# Patient Record
Sex: MALE | Race: WHITE | Employment: FULL TIME | URBAN - METROPOLITAN AREA
[De-identification: names, ages, dates, MRNs, and addresses within clinical notes are randomized per-mention and may not be internally consistent; named-entity substitution may affect disease eponyms.]

---

## 2017-01-25 ENCOUNTER — HOSPITAL ENCOUNTER (OUTPATIENT)
Dept: ULTRASOUND IMAGING | Age: 42
Discharge: HOME | End: 2017-01-25
Attending: FAMILY MEDICINE
Payer: COMMERCIAL

## 2017-01-25 ENCOUNTER — OFFICE VISIT (OUTPATIENT)
Dept: FAMILY MEDICINE CLINIC | Facility: CLINIC | Age: 42
End: 2017-01-25

## 2017-01-25 VITALS
WEIGHT: 178 LBS | DIASTOLIC BLOOD PRESSURE: 78 MMHG | TEMPERATURE: 98 F | RESPIRATION RATE: 16 BRPM | HEART RATE: 66 BPM | HEIGHT: 69 IN | SYSTOLIC BLOOD PRESSURE: 106 MMHG | BODY MASS INDEX: 26.36 KG/M2

## 2017-01-25 DIAGNOSIS — R25.1 OCCASIONAL TREMORS: ICD-10-CM

## 2017-01-25 DIAGNOSIS — R63.4 UNINTENTIONAL WEIGHT LOSS: Primary | ICD-10-CM

## 2017-01-25 DIAGNOSIS — R63.4 UNINTENTIONAL WEIGHT LOSS: ICD-10-CM

## 2017-01-25 PROCEDURE — 99213 OFFICE O/P EST LOW 20 MIN: CPT | Performed by: FAMILY MEDICINE

## 2017-01-25 PROCEDURE — 76536 US EXAM OF HEAD AND NECK: CPT

## 2017-01-25 NOTE — PROGRESS NOTES
Pt here with wife and has not been seen since 2015. He has dropped weight quickly and cannot stop shaking and has had an unintentional weight loss of 20 pounds in past year and tremors and shaking all the time. He is flushed as well.  He feels jittery and h shortness of breath with exertion  CARDIOVASCULAR: denies chest pain on exertion but feels increased heart rate on and off   GI: denies abdominal pain and denies heartburn; no blood in stool or urine   NEURO: denies headaches; tremors of arms and legs at r Thyroid peroxidase & thyroglobulin ab [E]; Future  -     Comp Metabolic Panel (14) [E]  -     CBC W Differential W Platelet [E]  -     Magnesium [E]; Future  -     US THYROID ([id number]=[id number]);  Future    Dr. Polo Solis

## 2017-01-25 NOTE — PROGRESS NOTES
Quick Note:    Please call pt with normal results of thyroid US with no growths seen-- Dr. Jadiel Head  ______

## 2017-01-25 NOTE — PATIENT INSTRUCTIONS
Mendel Rand you were seen for tremors and weight loss highly suspicious for thyroid concerns. Get labs done today and an US of thyroid. See me in a week for follow up and discuss results. Take care, Dr. Niko Siu    Please read only on the following:    Wh · Feeling unusually cold when others feel comfortable  Symptoms of hyperthyroidism include:  · Restlessness  · Fast weight loss  · Sweating  · Fast heartbeat (more than 100 beats per minute)  · Feeling unusually hot when others feel comfortable  Eye care

## 2017-01-26 NOTE — PROGRESS NOTES
Quick Note:    Please call lab and make sure the pt's thyroid labs were drawn--not seeing the results? His blood sugar is up a little as well as his bilirubin. Rest of CMP is stable.  Cbc is stable other than mild increase in RBC---I really need his thyro

## 2017-01-28 LAB
ABSOLUTE BASOPHILS: 14 CELLS/UL (ref 0–200)
ABSOLUTE EOSINOPHILS: 80 CELLS/UL (ref 15–500)
ABSOLUTE LYMPHOCYTES: 2251 CELLS/UL (ref 850–3900)
ABSOLUTE MONOCYTES: 564 CELLS/UL (ref 200–950)
ABSOLUTE NEUTROPHILS: 1791 CELLS/UL (ref 1500–7800)
ALBUMIN/GLOBULIN RATIO: 1.5 (CALC) (ref 1–2.5)
ALBUMIN: 4.1 G/DL (ref 3.6–5.1)
ALKALINE PHOSPHATASE: 110 U/L (ref 40–115)
ALT: 34 U/L (ref 9–46)
AST: 23 U/L (ref 10–40)
BASOPHILS: 0.3 %
BILIRUBIN, TOTAL: 2.1 MG/DL (ref 0.2–1.2)
BUN: 23 MG/DL (ref 7–25)
CALCIUM: 9.9 MG/DL (ref 8.6–10.3)
CARBON DIOXIDE: 28 MMOL/L (ref 20–31)
CHLORIDE: 102 MMOL/L (ref 98–110)
CREATININE: 0.85 MG/DL (ref 0.6–1.35)
EGFR IF AFRICN AM: 124 ML/MIN/1.73M2
EGFR IF NONAFRICN AM: 107 ML/MIN/1.73M2
EOSINOPHILS: 1.7 %
GLOBULIN: 2.8 G/DL (CALC) (ref 1.9–3.7)
GLUCOSE: 112 MG/DL (ref 65–99)
HEMATOCRIT: 47.6 % (ref 38.5–50)
HEMOGLOBIN: 16 G/DL (ref 13.2–17.1)
LYMPHOCYTES: 47.9 %
MCH: 27.1 PG (ref 27–33)
MCHC: 33.6 G/DL (ref 32–36)
MCV: 80.8 FL (ref 80–100)
MONOCYTES: 12 %
MPV: 7.9 FL (ref 7.5–12.5)
NEUTROPHILS: 38.1 %
PLATELET COUNT: 233 THOUSAND/UL (ref 140–400)
POTASSIUM: 4 MMOL/L (ref 3.5–5.3)
PROTEIN, TOTAL: 6.9 G/DL (ref 6.1–8.1)
RDW: 12.6 % (ref 11–15)
RED BLOOD CELL COUNT: 5.89 MILLION/UL (ref 4.2–5.8)
SODIUM: 138 MMOL/L (ref 135–146)
T4, FREE: 4.2 NG/DL (ref 0.8–1.8)
THYROGLOBULIN ANTIBODIES: 68 IU/ML
THYROID PEROXIDASE$ANTIBODIES: >900 IU/ML
TSH W/REFLEX TO FT4: <0.01 MIU/L (ref 0.4–4.5)
WHITE BLOOD CELL COUNT: 4.7 THOUSAND/UL (ref 3.8–10.8)

## 2017-01-30 NOTE — PROGRESS NOTES
Quick Note:    Please call pt with confirmation of hyperthyroidism as TSH suppressed and antibodies elevated over 900 as reason for his palpitations and weight loss and tremors--he will need referral to endocrinologist ASAP. Dr. Mar Melendez or Dr. Marian Covington.  He is hugo

## 2017-01-31 ENCOUNTER — TELEPHONE (OUTPATIENT)
Dept: FAMILY MEDICINE CLINIC | Facility: CLINIC | Age: 42
End: 2017-01-31

## 2017-01-31 NOTE — PROGRESS NOTES
Quick Note:    Patient notified of new DX of hyperthyroidism and elevated thyroid antibodies which is most likely the cause of his heart palpitations, weight loss and tremors.  I gave him the name and number to Dr boyle to call and schedule a appointment [facility]

## 2017-02-02 ENCOUNTER — OFFICE VISIT (OUTPATIENT)
Dept: FAMILY MEDICINE CLINIC | Facility: CLINIC | Age: 42
End: 2017-02-02

## 2017-02-02 VITALS
TEMPERATURE: 99 F | WEIGHT: 180 LBS | BODY MASS INDEX: 26.66 KG/M2 | HEART RATE: 72 BPM | HEIGHT: 69 IN | DIASTOLIC BLOOD PRESSURE: 76 MMHG | SYSTOLIC BLOOD PRESSURE: 114 MMHG | RESPIRATION RATE: 16 BRPM

## 2017-02-02 DIAGNOSIS — R25.1 TREMOR OF BOTH HANDS: ICD-10-CM

## 2017-02-02 DIAGNOSIS — R63.4 WEIGHT LOSS, NON-INTENTIONAL: ICD-10-CM

## 2017-02-02 DIAGNOSIS — E05.90 PRIMARY HYPERTHYROIDISM: Primary | ICD-10-CM

## 2017-02-02 PROCEDURE — 99213 OFFICE O/P EST LOW 20 MIN: CPT | Performed by: FAMILY MEDICINE

## 2017-02-02 NOTE — PROGRESS NOTES
Pt Rom Baird is a 35year old male was seen with recent unintentional weight loss and tremors and sent for labs on 1/25/17 and TSH was not detectable at <0.0005  and results gave confirmation of suspected primary hyperthyroidism as TSH suppressed Never Smoker                      Alcohol Use: Yes                Comment: occasionally        REVIEW OF SYSTEMS:   GENERAL HEALTH: feels off and here for new diagnosis of hyperthyroidism.  see HPI notes above for further details  SKIN: denies any unusual s advised for treatment options    Weight loss, non-intentional  Due to hyperthyroidism and pt to see endocrinologist     Tremor of both hands  Due to weight loss and hyperthyroid state--pt to see endocrinologist    Dr. Dhiraj Costello

## 2017-02-02 NOTE — PATIENT INSTRUCTIONS
When You Have Graves Disease  You have been diagnosed with Graves disease. This means you have an overactive thyroid gland (hyperthyroidism). Thyroid hormone is important to your body's growth and metabolism.  But if you have too much thyroid hormone, you · If you have eyelid swelling, sleep with your head elevated. · If you have eye irritation, ask your healthcare provider about ointments or artificial tears.  Wear glasses with side guards to protect your eyes from dust and wind. If you have trouble closin There are a number of causes of hyperthyroidism. The most common cause is Grave’s disease. This occurs when the body’s immune system causes the thyroid to grow and make more thyroid hormone than needed.   Symptoms of hyperthyroidism include:  · Nervousness, Call your healthcare provider right away if any of these occur:  · New symptoms occur  · Symptoms return, continue, or worsen even after treatment  · Extreme fatigue  · Puffy hands, face, or feet  · Confusion  Call 911  Call 911 right away if any of these

## 2017-03-18 ENCOUNTER — OFFICE VISIT (OUTPATIENT)
Dept: FAMILY MEDICINE CLINIC | Facility: CLINIC | Age: 42
End: 2017-03-18

## 2017-03-18 VITALS
TEMPERATURE: 99 F | WEIGHT: 190 LBS | OXYGEN SATURATION: 98 % | HEART RATE: 92 BPM | RESPIRATION RATE: 18 BRPM | DIASTOLIC BLOOD PRESSURE: 60 MMHG | BODY MASS INDEX: 28 KG/M2 | SYSTOLIC BLOOD PRESSURE: 120 MMHG

## 2017-03-18 DIAGNOSIS — J01.00 ACUTE MAXILLARY SINUSITIS, RECURRENCE NOT SPECIFIED: Primary | ICD-10-CM

## 2017-03-18 PROCEDURE — 99213 OFFICE O/P EST LOW 20 MIN: CPT | Performed by: NURSE PRACTITIONER

## 2017-03-18 RX ORDER — BENZONATATE 200 MG/1
200 CAPSULE ORAL 3 TIMES DAILY PRN
Qty: 20 CAPSULE | Refills: 0 | Status: SHIPPED | OUTPATIENT
Start: 2017-03-18 | End: 2017-03-25

## 2017-03-18 RX ORDER — FLUTICASONE PROPIONATE 50 MCG
2 SPRAY, SUSPENSION (ML) NASAL DAILY
Qty: 1 BOTTLE | Refills: 0 | Status: SHIPPED | OUTPATIENT
Start: 2017-03-18 | End: 2017-03-18 | Stop reason: CLARIF

## 2017-03-18 RX ORDER — DOXYCYCLINE HYCLATE 100 MG/1
100 CAPSULE ORAL 2 TIMES DAILY
Qty: 20 CAPSULE | Refills: 0 | Status: SHIPPED | OUTPATIENT
Start: 2017-03-18 | End: 2017-03-28
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
## 2017-12-21 ENCOUNTER — OFFICE VISIT (OUTPATIENT)
Dept: FAMILY MEDICINE CLINIC | Facility: CLINIC | Age: 42
End: 2017-12-21

## 2017-12-21 VITALS
OXYGEN SATURATION: 99 % | DIASTOLIC BLOOD PRESSURE: 76 MMHG | RESPIRATION RATE: 16 BRPM | SYSTOLIC BLOOD PRESSURE: 112 MMHG | HEART RATE: 65 BPM | HEIGHT: 69 IN | TEMPERATURE: 98 F | WEIGHT: 207 LBS | BODY MASS INDEX: 30.66 KG/M2

## 2017-12-21 DIAGNOSIS — R06.83 SNORING: ICD-10-CM

## 2017-12-21 DIAGNOSIS — Z00.8 TESTICULAR EXAM: ICD-10-CM

## 2017-12-21 DIAGNOSIS — Z71.82 EXERCISE COUNSELING: ICD-10-CM

## 2017-12-21 DIAGNOSIS — J45.20 MILD INTERMITTENT CHRONIC ASTHMA WITHOUT COMPLICATION: ICD-10-CM

## 2017-12-21 DIAGNOSIS — Z00.8 RECTAL EXAM: ICD-10-CM

## 2017-12-21 DIAGNOSIS — Z00.00 ROUTINE GENERAL MEDICAL EXAMINATION AT A HEALTH CARE FACILITY: Primary | ICD-10-CM

## 2017-12-21 DIAGNOSIS — Z71.3 DIETARY COUNSELING: ICD-10-CM

## 2017-12-21 DIAGNOSIS — Z13.31 DEPRESSION SCREEN: ICD-10-CM

## 2017-12-21 DIAGNOSIS — Z12.5 SCREENING FOR PROSTATE CANCER: ICD-10-CM

## 2017-12-21 PROCEDURE — 81003 URINALYSIS AUTO W/O SCOPE: CPT | Performed by: FAMILY MEDICINE

## 2017-12-21 PROCEDURE — 99396 PREV VISIT EST AGE 40-64: CPT | Performed by: FAMILY MEDICINE

## 2017-12-21 NOTE — PROGRESS NOTES
Rom Baird is a 38year old male who presents for a complete physical exam.  Due for labs. Has Graves disease and sees Dr. Korina Mora. Pt feeling well today.  Dentist and eye doctors--seen;   Diet -healthy on and off;    Exercise --started 4 weeks ago Rfl:       Past Medical History:   Diagnosis Date   • Acute bronchitis    • Acute bronchospasm    • Acute frontal sinusitis    • Acute maxillary sinusitis    • Asthmatic bronchitis    • Bronchospasm     acute   • Condyloma    • Cough     acute   • ETD (eus lb   SpO2 99%   BMI 30.57 kg/m²   Body mass index is 30.57 kg/m².    GENERAL: well developed, well nourished,in no apparent distress  SKIN: no rashes,no suspicious lesions  HEENT: atraumatic, normocephalic,ears and throat are clear  EYES:PERRLA, EOMI, kwame for well exam and testicular and rectal exams. He is clinically well. He is due for annual labs. He is to keep up with healthy diet and exercise and see Dr. Wiliam Knapp for [facility] management.  He is to return for annual exams and as needed    Screening for prostat

## 2018-01-07 ENCOUNTER — OFFICE VISIT (OUTPATIENT)
Dept: FAMILY MEDICINE CLINIC | Facility: CLINIC | Age: 43
End: 2018-01-07

## 2018-01-07 VITALS
OXYGEN SATURATION: 98 % | HEIGHT: 69 IN | RESPIRATION RATE: 16 BRPM | BODY MASS INDEX: 31.25 KG/M2 | HEART RATE: 88 BPM | TEMPERATURE: 98 F | DIASTOLIC BLOOD PRESSURE: 70 MMHG | WEIGHT: 211 LBS | SYSTOLIC BLOOD PRESSURE: 110 MMHG

## 2018-01-07 DIAGNOSIS — K12.2 UVULITIS: ICD-10-CM

## 2018-01-07 DIAGNOSIS — J02.9 SORE THROAT: Primary | ICD-10-CM

## 2018-01-07 LAB
CONTROL LINE PRESENT WITH A CLEAR BACKGROUND (YES/NO): YES YES/NO
STREP GRP A CUL-SCR: NEGATIVE

## 2018-01-07 PROCEDURE — 99213 OFFICE O/P EST LOW 20 MIN: CPT | Performed by: PHYSICIAN ASSISTANT

## 2018-01-07 PROCEDURE — 87880 STREP A ASSAY W/OPTIC: CPT | Performed by: PHYSICIAN ASSISTANT

## 2018-01-07 RX ORDER — AMOXICILLIN 875 MG/1
875 TABLET, COATED ORAL 2 TIMES DAILY
Qty: 20 TABLET | Refills: 0 | Status: SHIPPED | OUTPATIENT
Start: 2018-01-07 | End: 2018-01-09

## 2018-01-07 NOTE — PROGRESS NOTES
CHIEF COMPLAINT:   Patient presents with:  Sore Throat: X 2 days sore throat, cough       HPI:   Larry Saldaña is a 38year old male who presents for URI sxs for  2 days. Patient reports sore throat, slight cough-dry, minimal nasal congestion.  Dhiraj Comment: occasionally         REVIEW OF SYSTEMS:   GENERAL: Decreased appetite  SKIN: no rashes or abnormal skin lesions  HEENT: See HPI  LUNGS: denies shortness of breath or wheezing, See HPI  CARDIOVASCULAR: denies chest pain or palpitations   GI: den PLAN: Meds as below. See patient Instructions    Meds & Refills for this Visit:    Signed Prescriptions Disp Refills    amoxicillin 875 MG Oral Tab 20 tablet 0      Sig: Take 1 tablet (875 mg total) by mouth 2 (two) times daily.            Risks, benefits, The doctor may prescribe antibiotics for an infection. For an allergic reaction or angioedema, medicines called steroids or antihistamines may be given. Follow instructions when using any medicine.   General care  To care for the condition at home:  · Rest · Child younger than 3years of age has a fever of 100.4°F (38°C) that continues for more than 1 day. · Child 3years old or older has a fever of 100.4°F (38°C) that continues for more than 3 days.   Call 911  Call 911 if any of these occur:  · Drooling or Over-the-counter medicine can reduce sore throat symptoms. Ask your pharmacist if you have questions about which medicine to use:  · Ease pain with anesthetic sprays. Aspirin or an aspirin substitute also helps.  Remember, never give aspirin to anyone 18 or

## 2018-01-07 NOTE — PATIENT INSTRUCTIONS
-GO to ER with any worsening symptoms  -Motrin every 4-6 hours as needed    Uvulitis    The uvula is the tissue that hangs in the back of the throat. Uvulitis is inflammation of the uvula.  Inflammation happens when the body responds to an injury, allergic · If medicines were prescribed, be sure they are taken as directed. They should be taken until they are gone or the healthcare provider says to stop them.   · If you were told that your angioedema was from a medicine that you are taking, you must stop [place] · Swollen tongue, lips, face, or throat. You can tell this if your child's voice changes.   · Jerking and loss of consciousness; seizure  · Lack of response, extreme drowsiness, or trouble waking up  · Fainting  · Confusion  · Child being unresponsive  · Sk · Remember: unless a sore throat is caused by a bacterial infection, antibiotics won’t help you. Prevent future sore throats  Prevention tips include the following:  · Stop smoking or reduce contact with secondhand smoke.  Smoke irritates the tender throat

## 2018-01-09 ENCOUNTER — OFFICE VISIT (OUTPATIENT)
Dept: FAMILY MEDICINE CLINIC | Facility: CLINIC | Age: 43
End: 2018-01-09

## 2018-01-09 VITALS
HEART RATE: 90 BPM | WEIGHT: 204 LBS | RESPIRATION RATE: 16 BRPM | SYSTOLIC BLOOD PRESSURE: 120 MMHG | DIASTOLIC BLOOD PRESSURE: 80 MMHG | HEIGHT: 69 IN | BODY MASS INDEX: 30.21 KG/M2 | TEMPERATURE: 99 F

## 2018-01-09 DIAGNOSIS — R53.81 MALAISE AND FATIGUE: Primary | ICD-10-CM

## 2018-01-09 DIAGNOSIS — L50.9 HIVES: ICD-10-CM

## 2018-01-09 DIAGNOSIS — R53.83 MALAISE AND FATIGUE: Primary | ICD-10-CM

## 2018-01-09 PROCEDURE — 99213 OFFICE O/P EST LOW 20 MIN: CPT | Performed by: FAMILY MEDICINE

## 2018-01-09 PROCEDURE — 87798 DETECT AGENT NOS DNA AMP: CPT | Performed by: FAMILY MEDICINE

## 2018-01-09 PROCEDURE — 87502 INFLUENZA DNA AMP PROBE: CPT | Performed by: FAMILY MEDICINE

## 2018-01-09 RX ORDER — OSELTAMIVIR PHOSPHATE 75 MG/1
75 CAPSULE ORAL 2 TIMES DAILY
Qty: 10 CAPSULE | Refills: 0 | Status: SHIPPED | OUTPATIENT
Start: 2018-01-09 | End: 2018-01-14

## 2018-01-09 RX ORDER — METHYLPREDNISOLONE 4 MG/1
TABLET ORAL
Qty: 1 KIT | Refills: 0 | Status: SHIPPED | OUTPATIENT
Start: 2018-01-09 | End: 2018-01-26

## 2018-01-09 NOTE — PATIENT INSTRUCTIONS
Thank you for choosing Nona Baptiste MD at [place] [address]  To Do: Krista Buchanan III  1.  Please keep hydrated; may take Tylenol and/or Ibuprofen as needed for fever and/or pain  Effective 6/19/17 until November 2017  Due to Larry Trimble risk of harm or side effects or medication interactions.  It is your duty and for your safety to discuss with the pharmacist and our office with questions, and to notify us and stop treatment if problems arise, but know that our intention is that the benef

## 2018-01-09 NOTE — PROGRESS NOTES
HPI:    Patient ID: Debby Jolly is a 38year old male.     HPI  Mr. Delmi Galloway is a pleasant 39 y/o M who was seen at the walk-in clinic this past weekend for sore throat and difficulty swallowing associated with fatigue determined secondary to uvulitis Azelastine-Fluticasone (DYMISTA) 137-50 MCG/ACT Nasal Suspension 1 spray by Nasal route daily. Disp:  Rfl:    Fexofenadine HCl (ALLEGRA OR) Take 1 tablet by mouth daily.    Disp:  Rfl:      Allergies:  Penicillins             Hives  Sulfa Antibiotics agree with holding amoxicillin; prescribed with Medrol Dosepak and to continue taking Benadryl    Follow-up as needed or come in sooner if patient's symptoms worsen or persist.      Orders Placed This Encounter      Influenza A/B PCR [E]    Meds This Visit

## 2018-01-14 ENCOUNTER — OFFICE VISIT (OUTPATIENT)
Dept: SLEEP CENTER | Facility: HOSPITAL | Age: 43
End: 2018-01-14
Attending: FAMILY MEDICINE
Payer: COMMERCIAL

## 2018-01-14 PROCEDURE — 95810 POLYSOM 6/> YRS 4/> PARAM: CPT

## 2018-01-17 NOTE — PROCEDURES
[address] [place] [address]       Accredited by the [facility] of Sleep Medicine (AASM)    PATIENT'S NAME:        Casey Fong  ATTENDING PHYSICIAN:   Fredrick Martinez M.D. REFERRING PHYSICIAN:   [address] [place], [place] [address]BRIE amounts comprising 5.5% of sleep. Slow-wave sleep was seen in normal amounts comprising 19.2% of sleep. REM sleep was seen in normal amounts comprising 22.3% of sleep.      RESPIRATORY MEASURES:  Respiratory monitoring showed primary snoring without signi

## 2018-01-17 NOTE — PROGRESS NOTES
Please call pt to come in and discuss sleep study results with Dr. Tim Bustamante in next few weeks ---  Dr. Tim Bustamante

## 2018-01-24 ENCOUNTER — OFFICE VISIT (OUTPATIENT)
Dept: FAMILY MEDICINE CLINIC | Facility: CLINIC | Age: 43
End: 2018-01-24

## 2018-01-24 VITALS
DIASTOLIC BLOOD PRESSURE: 76 MMHG | WEIGHT: 207 LBS | RESPIRATION RATE: 14 BRPM | HEART RATE: 82 BPM | BODY MASS INDEX: 30.66 KG/M2 | SYSTOLIC BLOOD PRESSURE: 122 MMHG | OXYGEN SATURATION: 99 % | HEIGHT: 69 IN | TEMPERATURE: 98 F

## 2018-01-24 DIAGNOSIS — R06.83 PRIMARY SNORING: Primary | ICD-10-CM

## 2018-01-24 DIAGNOSIS — E05.00 GRAVES' DISEASE: ICD-10-CM

## 2018-01-24 PROCEDURE — 99213 OFFICE O/P EST LOW 20 MIN: CPT | Performed by: FAMILY MEDICINE

## 2018-01-24 NOTE — PATIENT INSTRUCTIONS
Zabrina Santizo you were seen for sleep study report and you do not have apnea but because of weight gain, you snore. The sleep doctor advised as well as I do, weight loss. Please start working out and watching diet as well.  I advise 15 pound weight loss in next Exercise can help you lose weight, tone your muscles, and make your lungs work better. These changes may help improve your snoring. Ask your healthcare provider about an exercise program like walking, or something else that you enjoy.   Unblock your nose  I You have been diagnosed with Graves disease. This means you have an overactive thyroid gland (hyperthyroidism). Thyroid hormone is important to your body's growth and metabolism.  But if you have too much thyroid hormone, your body's processes may speed up · If you have eyelid swelling, sleep with your head elevated. · If you have eye irritation, ask your healthcare provider about ointments or artificial tears.  Wear glasses with side guards to protect your eyes from dust and wind. If you have trouble closin

## 2018-01-24 NOTE — PROGRESS NOTES
Alexandra Breaux is a 38year old male. HPI:   Pt here to discuss recent sleep report which was reviewed face to face.  He was seen for his annual visit in 12/2017 and complained of snoring and apnea concerns with weight gains after being diagnosed with pain on exertion  GI: denies abdominal pain and denies heartburn  NEURO: denies headaches  ENDO: has Graves and diagnosed this past year and struggled with control but now better under Dr. Rickey Ramesh: no depression or mood issues   EXAM:   /76 (BP

## 2018-01-25 NOTE — PROGRESS NOTES
Reviewed face to face with pt on 1/24/18 at [address] [place] [address] Rd.  Weight loss encouraged --Dr. Loni Sarmiento

## 2018-01-25 NOTE — PROGRESS NOTES
Already reviewed face to face with pt at [address] on 1/24/18. Pt aware needs to lose weight.  Dr. Ana Paula Courtney

## 2018-01-26 ENCOUNTER — OFFICE VISIT (OUTPATIENT)
Dept: FAMILY MEDICINE CLINIC | Facility: CLINIC | Age: 43
End: 2018-01-26

## 2018-01-26 VITALS
HEIGHT: 69 IN | RESPIRATION RATE: 16 BRPM | TEMPERATURE: 99 F | SYSTOLIC BLOOD PRESSURE: 118 MMHG | WEIGHT: 204.5 LBS | HEART RATE: 95 BPM | BODY MASS INDEX: 30.29 KG/M2 | DIASTOLIC BLOOD PRESSURE: 60 MMHG

## 2018-01-26 DIAGNOSIS — D47.01: ICD-10-CM

## 2018-01-26 DIAGNOSIS — J11.1 FLU: Primary | ICD-10-CM

## 2018-01-26 PROCEDURE — 96372 THER/PROPH/DIAG INJ SC/IM: CPT | Performed by: NURSE PRACTITIONER

## 2018-01-26 PROCEDURE — 99213 OFFICE O/P EST LOW 20 MIN: CPT | Performed by: NURSE PRACTITIONER

## 2018-01-26 PROCEDURE — 87502 INFLUENZA DNA AMP PROBE: CPT | Performed by: NURSE PRACTITIONER

## 2018-01-26 PROCEDURE — 87798 DETECT AGENT NOS DNA AMP: CPT | Performed by: NURSE PRACTITIONER

## 2018-01-26 RX ORDER — METHYLPREDNISOLONE 4 MG/1
TABLET ORAL
Qty: 1 KIT | Refills: 0 | Status: SHIPPED | OUTPATIENT
Start: 2018-01-27 | End: 2018-12-31 | Stop reason: ALTCHOICE

## 2018-01-26 RX ORDER — METHYLPREDNISOLONE ACETATE 40 MG/ML
40 INJECTION, SUSPENSION INTRA-ARTICULAR; INTRALESIONAL; INTRAMUSCULAR; SOFT TISSUE ONCE
Status: COMPLETED | OUTPATIENT
Start: 2018-01-26 | End: 2018-01-26

## 2018-01-26 RX ORDER — METHYLPREDNISOLONE ACETATE 40 MG/ML
40 INJECTION, SUSPENSION INTRA-ARTICULAR; INTRALESIONAL; INTRAMUSCULAR; SOFT TISSUE ONCE
Qty: 1 ML | Refills: 0 | Status: SHIPPED | OUTPATIENT
Start: 2018-01-26 | End: 2018-01-26

## 2018-01-26 RX ORDER — RANITIDINE 150 MG/1
150 TABLET ORAL 2 TIMES DAILY
Qty: 60 TABLET | Refills: 0 | Status: SHIPPED | OUTPATIENT
Start: 2018-01-26 | End: 2019-12-17 | Stop reason: ALTCHOICE

## 2018-01-26 RX ORDER — FEXOFENADINE HCL AND PSEUDOEPHEDRINE HCI 180; 240 MG/1; MG/1
1 TABLET, EXTENDED RELEASE ORAL DAILY
Qty: 90 TABLET | Refills: 3 | Status: SHIPPED | OUTPATIENT
Start: 2018-01-26 | End: 2018-04-26

## 2018-01-26 RX ORDER — OSELTAMIVIR PHOSPHATE 75 MG/1
75 CAPSULE ORAL 2 TIMES DAILY
Qty: 10 CAPSULE | Refills: 0 | Status: SHIPPED | OUTPATIENT
Start: 2018-01-26 | End: 2018-01-31

## 2018-01-26 RX ADMIN — METHYLPREDNISOLONE ACETATE 40 MG: 40 INJECTION, SUSPENSION INTRA-ARTICULAR; INTRALESIONAL; INTRAMUSCULAR; SOFT TISSUE at 14:04:00

## 2018-01-26 NOTE — PROGRESS NOTES
[facility] Internal Medicine Office Note  Chief Complaint:   Patient presents with:  Rash: all over body - getting worse - took benadryl  Fever: son has the flu - wife had the flu - 101 temp -> took advil  Cough    HPI:   This is a 38year old ma 10 mg by mouth nightly. Disp:  Rfl:    Mometasone Furo-Formoterol Fum (DULERA) 200-5 MCG/ACT Inhalation Aerosol Inhale into the lungs.  Two puffs twice daily Disp:  Rfl:    Azelastine-Fluticasone (DYMISTA) 137-50 MCG/ACT Nasal Suspension 1 spray by Nasal ro +full body urticarial +pruritic, plaques worse on chest, back and face.         ASSESSMENT AND PLAN:   Helane Apgar is a 38year old male with  Flu  (primary encounter diagnosis)  Urticaria pigmentosa, systemic      The plan is as follows  Sheree tabor Consults:  None    Influenza Vaccine(1) due on 09/01/2017  Patient/Caregiver Education: There are no barriers to learning. Medical education done. Outcome: Patient verbalizes understanding.  Patient is notified to call with any questions, complications, all

## 2018-01-26 NOTE — PATIENT INSTRUCTIONS
Thank you for choosing [facility] at [place] [address]  To Do: [place] Counts III  1. Start taking zantac, allergra, and medrol dose pack (start tomorrow)  -injection today  2.  F/u in the office in 4-5 days or if symptoms worsen    Effectiv even beyond those discussed today.  All therapies have potential risk of harm or side effects or medication interactions.  It is your duty and for your safety to discuss with the pharmacist and our office with questions, and to notify us and stop treatment

## 2018-12-04 ENCOUNTER — TELEPHONE (OUTPATIENT)
Dept: FAMILY MEDICINE CLINIC | Facility: CLINIC | Age: 43
End: 2018-12-04

## 2018-12-16 PROBLEM — E05.00 GRAVES DISEASE: Status: ACTIVE | Noted: 2018-01-24

## 2018-12-31 ENCOUNTER — OFFICE VISIT (OUTPATIENT)
Dept: FAMILY MEDICINE CLINIC | Facility: CLINIC | Age: 43
End: 2018-12-31
Payer: COMMERCIAL

## 2018-12-31 VITALS
BODY MASS INDEX: 29.18 KG/M2 | HEART RATE: 68 BPM | SYSTOLIC BLOOD PRESSURE: 110 MMHG | RESPIRATION RATE: 16 BRPM | WEIGHT: 197 LBS | TEMPERATURE: 98 F | HEIGHT: 69 IN | DIASTOLIC BLOOD PRESSURE: 70 MMHG

## 2018-12-31 DIAGNOSIS — J45.21 ASTHMATIC BRONCHITIS, MILD INTERMITTENT, WITH ACUTE EXACERBATION: ICD-10-CM

## 2018-12-31 DIAGNOSIS — E05.00 GRAVES DISEASE: ICD-10-CM

## 2018-12-31 DIAGNOSIS — Z00.00 ROUTINE MEDICAL EXAM: Primary | ICD-10-CM

## 2018-12-31 DIAGNOSIS — Z00.00 LABORATORY EXAM ORDERED AS PART OF ROUTINE GENERAL MEDICAL EXAMINATION: ICD-10-CM

## 2018-12-31 PROCEDURE — 99396 PREV VISIT EST AGE 40-64: CPT | Performed by: FAMILY MEDICINE

## 2018-12-31 RX ORDER — ALBUTEROL SULFATE 90 UG/1
1-2 AEROSOL, METERED RESPIRATORY (INHALATION) EVERY 4 HOURS PRN
Qty: 1 INHALER | Refills: 0 | COMMUNITY
Start: 2018-12-31

## 2018-12-31 NOTE — PROGRESS NOTES
Patient presents with:  Physical: Annual px      HPI:  Pia Gutiérrez is a 37year old male here today for preventative visit. Imms- up to date with tdap and flu. Had the flu last year so go the flu shot for the first itme this year.       Alli Sinks Sodium (SINGULAIR) 10 MG Oral Tab Take 10 mg by mouth nightly. Disp:  Rfl:    Mometasone Furo-Formoterol Fum (DULERA) 200-5 MCG/ACT Inhalation Aerosol Inhale into the lungs.  Two puffs twice daily Disp:  Rfl:    Azelastine-Fluticasone (DYMISTA) 137-50 MCG/A Relation Age of Onset   • Diabetes Mother    • No Known Problems Brother    • No Known Problems Sister    • No Known Problems Sister    • Arthritis Paternal Grandfather    • Stroke Paternal Grandfather    • Other (Cancer, stomach/liver) Paternal Grandfathe Inhaler; Refill: 0    4. Graves disease  -cont to see Dr. Shawn Palacios. Requested and reviewed recent TSH/free T4 and normal. Scanned. Patient verbalized understanding and agrees to plan.       Return in about 1 year (around 12/31/2019) for annual physical.

## 2019-09-27 NOTE — PATIENT INSTRUCTIONS
Follow-up in late November for a new baseline chest CT scan (OK to have done through pulmonary physician at [facility]), then every 6 months x 2 years.    Call if further concerns or questions.   Sunil Ramsey you were seen today for your annual physical. Get fasting labs done soon at [address] [facility] the sleep study done as well. See Dr. Deon Dupont for [facility] management. Use the inhaler for asthma with exercise and animal dander exposures.  Please continue healthy h Your healthcare provider will help you prepare, and when needed, update your personal Asthma Action Plan. Your plan tells you what to do based on your current symptoms.  If you don't have an Asthma Action Plan, or if yours isn't up-to-date, make sure you ta Prostate cancer Starting at age 39, talk to healthcare provider about risks and benefits of digital rectal exam (VICKY) and prostate-specific antigen (PSA) screening1 At routine exams   Syphilis Men at increased risk for infection – talk with your healthcare pertussis (Td/Tdap) booster All men in this age group Td every 10 years, or a one-time dose of Tdap instead of a Td booster after age 25, then Td every 10 years   Counseling Who needs it How often   Diet and exercise Men who are overweight or obese When [facility]

## 2019-12-19 ENCOUNTER — OFFICE VISIT (OUTPATIENT)
Dept: FAMILY MEDICINE CLINIC | Facility: CLINIC | Age: 44
End: 2019-12-19
Payer: COMMERCIAL

## 2019-12-19 VITALS
BODY MASS INDEX: 28.56 KG/M2 | RESPIRATION RATE: 16 BRPM | DIASTOLIC BLOOD PRESSURE: 70 MMHG | WEIGHT: 192.81 LBS | HEART RATE: 69 BPM | SYSTOLIC BLOOD PRESSURE: 100 MMHG | HEIGHT: 69 IN | OXYGEN SATURATION: 98 % | TEMPERATURE: 98 F

## 2019-12-19 DIAGNOSIS — J20.9 BRONCHITIS WITH BRONCHOSPASM: Primary | ICD-10-CM

## 2019-12-19 PROCEDURE — 99213 OFFICE O/P EST LOW 20 MIN: CPT | Performed by: NURSE PRACTITIONER

## 2019-12-19 RX ORDER — PREDNISONE 20 MG/1
20 TABLET ORAL 2 TIMES DAILY
Qty: 10 TABLET | Refills: 0 | Status: SHIPPED | OUTPATIENT
Start: 2019-12-19 | End: 2019-12-24

## 2019-12-19 RX ORDER — BENZONATATE 200 MG/1
200 CAPSULE ORAL 3 TIMES DAILY PRN
Qty: 20 CAPSULE | Refills: 0 | Status: SHIPPED | OUTPATIENT
Start: 2019-12-19 | End: 2019-12-26

## 2019-12-19 NOTE — PROGRESS NOTES
CHIEF COMPLAINT:   Patient presents with:  Cough: cough, chest pain when cough, headache, x 1wk     HPI:   Keanu Villeda is a 53year old male who presents for cough for  7  days. Cough started gradually and is described as tight and deep.  Cough is Tobacco Use      Smoking status: Never Smoker      Smokeless tobacco: Never Used    Alcohol use:  Yes      Alcohol/week: 0.0 standard drinks      Frequency: 4 or more times a week      Drinks per session: 1 or 2      Comment: couple times/wk, never a prob Risks, benefits, and side effects of medication explained and discussed. The patient is asked to follow-up with PCP if no improvement in 3-5 days or sooner if sx worsen.      Requested Prescriptions     Signed Prescriptions Disp Refills   • predniSONE 20 · You may use over-the-counter medicine to control fever or pain, unless another pain medicine was prescribed. If you have chronic liver or kidney disease or have ever had a stomach ulcer or gastrointestinal bleeding, talk with your healthcare provider bef © 3433-7765 The [address]. [address] [facility], [address] [place]. All rights reserved. This information is not intended as a substitute for professional medical care. Always follow your healthcare professional's instructions.             The

## 2020-10-08 NOTE — LETTER
Date: 1/9/2018    Patient Name: Rowena Somers          To Whom it may concern:     This letter has been written at the patient's request. The above patient was seen at the [facility] for treatment of a viral illness    This patient shoul Helical Rim Advancement Flap Text: The defect edges were debeveled with a #15 blade scalpel.  Given the location of the defect and the proximity to free margins (helical rim) a double helical rim advancement flap was deemed most appropriate.  Using a sterile surgical marker, the appropriate advancement flaps were drawn incorporating the defect and placing the expected incisions between the helical rim and antihelix where possible.  The area thus outlined was incised through and through with a #15 scalpel blade.  With a skin hook and iris scissors, the flaps were gently and sharply undermined and freed up.

## 2020-11-17 ENCOUNTER — TELEMEDICINE (OUTPATIENT)
Dept: FAMILY MEDICINE CLINIC | Facility: CLINIC | Age: 45
End: 2020-11-17
Payer: COMMERCIAL

## 2020-11-17 DIAGNOSIS — R51.9 ACUTE NONINTRACTABLE HEADACHE, UNSPECIFIED HEADACHE TYPE: ICD-10-CM

## 2020-11-17 DIAGNOSIS — R50.9 ELEVATED TEMPERATURE: Primary | ICD-10-CM

## 2020-11-17 DIAGNOSIS — R53.83 FATIGUE, UNSPECIFIED TYPE: ICD-10-CM

## 2020-11-17 DIAGNOSIS — M79.10 MYALGIA: ICD-10-CM

## 2020-11-17 PROCEDURE — 99213 OFFICE O/P EST LOW 20 MIN: CPT | Performed by: FAMILY MEDICINE

## 2020-11-17 NOTE — PROGRESS NOTES
phone Visit- [address] [facility]  This is a telemedicine visit with live, interactive video and audio.      Jayden Caputo understands and accepts financial responsibility for any deductible, co-insurance and/or co- Elevated temperature  - SARS-COV-2 BY PCR (); Future    2. Acute nonintractable headache, unspecified headache type  - SARS-COV-2 BY PCR (); Future    3. Myalgia  - SARS-COV-2 BY PCR (); Future    4.  Fatigue, unspecified type  - SARS-COV-2 B

## 2020-11-18 ENCOUNTER — APPOINTMENT (OUTPATIENT)
Dept: LAB | Age: 45
End: 2020-11-18
Attending: FAMILY MEDICINE
Payer: COMMERCIAL

## 2020-11-18 DIAGNOSIS — M79.10 MYALGIA: ICD-10-CM

## 2020-11-18 DIAGNOSIS — R51.9 ACUTE NONINTRACTABLE HEADACHE, UNSPECIFIED HEADACHE TYPE: ICD-10-CM

## 2020-11-18 DIAGNOSIS — R53.83 FATIGUE, UNSPECIFIED TYPE: ICD-10-CM

## 2020-11-18 DIAGNOSIS — R50.9 ELEVATED TEMPERATURE: ICD-10-CM

## 2021-01-28 ENCOUNTER — TELEMEDICINE (OUTPATIENT)
Dept: FAMILY MEDICINE CLINIC | Facility: CLINIC | Age: 46
End: 2021-01-28

## 2021-01-28 DIAGNOSIS — J45.40 MODERATE PERSISTENT ASTHMA WITHOUT COMPLICATION: ICD-10-CM

## 2021-01-28 DIAGNOSIS — U07.1 COVID-19 VIRUS INFECTION: Primary | ICD-10-CM

## 2021-01-28 PROCEDURE — 99214 OFFICE O/P EST MOD 30 MIN: CPT | Performed by: FAMILY MEDICINE

## 2021-01-28 NOTE — PROGRESS NOTES
Video Visit- [facility]   This is a telemedicine visit with live, interactive video and audio.      Pia Gutiérrez understands and accepts financial responsibility for any deductible, co-insurance and/or co- Maternal Grandfather            Physical Exam:  There were no vitals taken for this visit.     GENERAL APPEARANCE:  Alert, no acute distress, appears stated age  HEENT:  NCAT, EOMI, mucus membranes moist  NECK:  No obvious goiter  PULMONARY:  Speaking in fu

## 2021-02-01 ENCOUNTER — TELEMEDICINE (OUTPATIENT)
Dept: FAMILY MEDICINE CLINIC | Facility: CLINIC | Age: 46
End: 2021-02-01

## 2021-02-01 DIAGNOSIS — U07.1 COVID-19: Primary | ICD-10-CM

## 2021-02-01 PROCEDURE — 99213 OFFICE O/P EST LOW 20 MIN: CPT | Performed by: FAMILY MEDICINE

## 2021-02-01 NOTE — PROGRESS NOTES
Subjective    This visit is conducted using Telemedicine with live, interactive video and audio.     Chief Complaint:  Patient presents with:  Covid        The patient confirmed knowledge of the limitations of the use of telemedicine were verbally confirm REMOVED  1991      Family History   Problem Relation Age of Onset   • Diabetes Mother    • No Known Problems Brother    • No Known Problems Sister    • No Known Problems Sister    • Arthritis Paternal Grandfather    • Stroke Paternal Grandfather    • Other

## 2021-02-02 NOTE — PATIENT INSTRUCTIONS
Coronavirus Disease 2019 (COVID-19): Caring for Yourself or Others  If you or a household member have symptoms of COVID-19, follow these guidelines for preventing spread of the virus, and managing symptoms.   If you think you have COVID-19 symptoms  · Sta · Tell the healthcare staff about recent travel. This includes local travel on public transport. Staff may need to find other people you have been in contact with. · Follow all instructions the healthcare staff give you.     If you have been diagnosed with The FDA has approved a vaccine to prevent COVID-19 in people 16 years and older who are not pregnant or breastfeeding. It's not currently available to the entire public.  The first phase of vaccine roll-out will go to healthcare staff and residents of long- If you've had confirmed COVID-19, your healthcare team may ask you to consider donating your plasma. This is called COVID-19 convalescent plasma donation.  Plasma from people fully recovered from COVID-19 may contain antibodies to help fight COVID-19 in peo Your limits are different if you've had COVID-19 in the last 3 months but are fully recovered without symptoms and you have been exposed to someone with COVID-19. If you are symptom-free, you don't need to stay home away from others or be retested.  The CDC When you return to public settings  When you are well enough to go outside your home, consider the CDC's guidance on cloth face masks:     · The CDC advises all people over age 2 to wear cloth face masks in public settings when around people outside of the © 2568-5859 The [address]. All rights reserved. This information is not intended as a substitute for professional medical care. Always follow your healthcare professional's instructions.

## 2021-06-14 ENCOUNTER — OFFICE VISIT (OUTPATIENT)
Dept: FAMILY MEDICINE CLINIC | Facility: CLINIC | Age: 46
End: 2021-06-14
Payer: COMMERCIAL

## 2021-06-14 VITALS
BODY MASS INDEX: 28.58 KG/M2 | SYSTOLIC BLOOD PRESSURE: 100 MMHG | RESPIRATION RATE: 16 BRPM | HEIGHT: 69 IN | OXYGEN SATURATION: 98 % | HEART RATE: 53 BPM | TEMPERATURE: 98 F | DIASTOLIC BLOOD PRESSURE: 64 MMHG | WEIGHT: 193 LBS

## 2021-06-14 DIAGNOSIS — Z00.00 ROUTINE MEDICAL EXAM: Primary | ICD-10-CM

## 2021-06-14 DIAGNOSIS — K62.5 BRBPR (BRIGHT RED BLOOD PER RECTUM): ICD-10-CM

## 2021-06-14 DIAGNOSIS — Z00.00 LABORATORY EXAM ORDERED AS PART OF ROUTINE GENERAL MEDICAL EXAMINATION: ICD-10-CM

## 2021-06-14 PROCEDURE — 99396 PREV VISIT EST AGE 40-64: CPT | Performed by: FAMILY MEDICINE

## 2021-06-14 PROCEDURE — 3078F DIAST BP <80 MM HG: CPT | Performed by: FAMILY MEDICINE

## 2021-06-14 PROCEDURE — 3074F SYST BP LT 130 MM HG: CPT | Performed by: FAMILY MEDICINE

## 2021-06-14 PROCEDURE — 3008F BODY MASS INDEX DOCD: CPT | Performed by: FAMILY MEDICINE

## 2021-06-14 RX ORDER — AZELASTINE 1 MG/ML
SPRAY, METERED NASAL
COMMUNITY
Start: 2021-05-15

## 2021-06-14 NOTE — PROGRESS NOTES
Patient presents with:  Physical      HPI:  Francisco Gusman is a 50year old male here today for preventative visit.         Imms- up to date with both covid & tdap.         Prostate/colon cancer screening- no early family h/o prostate or colon cancer. Rfl: 3  Albuterol Sulfate HFA (VENTOLIN HFA) 108 (90 Base) MCG/ACT Inhalation Aero Soln, Inhale 1-2 puffs into the lungs every 4 (four) hours as needed for Wheezing (cough). , Disp: 1 Inhaler, Rfl: 0  Montelukast Sodium (SINGULAIR) 10 MG Oral Tab, Take 10 m Difficulty of Paying Living Expenses:   Food Insecurity:       Worried About [email or web address] in the Last Year:       Ran Out of Food in the Last Year:   Transportation Needs:       Lack of Transportation (Medical):       Lack of Transportation (Non-Med auscultation bilaterally. No wheezes, rales, or rhonchi. Normal respiratory effort. CARDIOVASCULAR:  Regular rate and rhythm. No murmurs, gallops, or rubs. ABDOMEN:  + bowel sounds, soft, nontender, nondistended. No hepatomegaly.   MUSCULOSKELETAL: Streng

## 2021-12-18 ENCOUNTER — OFFICE VISIT (OUTPATIENT)
Dept: FAMILY MEDICINE CLINIC | Facility: CLINIC | Age: 46
End: 2021-12-18
Payer: COMMERCIAL

## 2021-12-18 VITALS
OXYGEN SATURATION: 98 % | WEIGHT: 193 LBS | TEMPERATURE: 98 F | HEIGHT: 69 IN | SYSTOLIC BLOOD PRESSURE: 115 MMHG | RESPIRATION RATE: 20 BRPM | BODY MASS INDEX: 28.58 KG/M2 | DIASTOLIC BLOOD PRESSURE: 80 MMHG | HEART RATE: 60 BPM

## 2021-12-18 DIAGNOSIS — J06.9 VIRAL URI WITH COUGH: Primary | ICD-10-CM

## 2021-12-18 DIAGNOSIS — Z11.52 ENCOUNTER FOR SCREENING FOR COVID-19: ICD-10-CM

## 2021-12-18 PROCEDURE — 99213 OFFICE O/P EST LOW 20 MIN: CPT | Performed by: NURSE PRACTITIONER

## 2021-12-18 PROCEDURE — 3079F DIAST BP 80-89 MM HG: CPT | Performed by: NURSE PRACTITIONER

## 2021-12-18 PROCEDURE — 3074F SYST BP LT 130 MM HG: CPT | Performed by: NURSE PRACTITIONER

## 2021-12-18 PROCEDURE — 3008F BODY MASS INDEX DOCD: CPT | Performed by: NURSE PRACTITIONER

## 2021-12-18 NOTE — PROGRESS NOTES
CHIEF COMPLAINT:   Patient presents with:  Cough: Headache, congestion, - Entered by patient      HPI:   Keanu Villeda is a 48year old male who presents for upper respiratory symptoms for  5 days.  Patient reports cough- productive in the morning and Use      Vaping Use: Never used    Alcohol use:  Yes      Alcohol/week: 0.0 standard drinks      Comment: 1 beer 2x/wk/wk, never a problem    Drug use: No        REVIEW OF SYSTEMS:   GENERAL: normal appetite  SKIN: no rashes or abnormal skin lesions  HEENT: explained and discussed. There are no Patient Instructions on file for this visit. The patient indicates understanding of these issues and agrees to the plan. The patient is asked to return if sx's persist or worsen.

## 2021-12-18 NOTE — PATIENT INSTRUCTIONS
Viral Upper Respiratory Illness (Adult)    You have a viral upper respiratory illness (URI), which is another term for the common cold. This illness is contagious during the first few days. It is spread through the air by coughing and sneezing.  It may al decongestants if you have high blood pressure.)  Follow-up care  Follow up with your healthcare provider, or as advised.   When to seek medical advice  Call your healthcare provider right away if any of these occur:  · Cough with lots of colored sputum (muc least 15 minutes  * You provided care at home to someone who is sick with COVID-19  * You had direct physical contact with the person (touched, hugged, or kissed them)  * You shared eating or drinking utensils  * They sneezed, coughed, or somehow got respi healthcare provider ahead of time and tell them that you have or may have COVID-19.  5. For medical emergencies, call 911 and notify the dispatch personnel that you have or may have COVID-19.   6. Cover your cough and sneezes.    7. Wash your hands often wi first appeared OR if asymptomatic patient or date of symptom onset is unclear then use 10 days post COVID test date. · At least 20 days have passed for severe illness (requiring hospitalization) OR if you are immunocompromised.   If you have a fever with your plasma to help treat others diagnosed with the virus, please contact [facility] directly on their website: [email or web address]    Who is eligible to donate convalescent plasma?     Potential convalescent plasma donor Shortness of breath Hair loss   GI disturbances  Fever  Swelling Joint Pain  Cough         Who is at risk for a Post-COVID condition? It is still too early to say for sure.   Currently, we find the people who experience Post-COVID conditions to be random

## 2022-06-15 ENCOUNTER — OFFICE VISIT (OUTPATIENT)
Dept: FAMILY MEDICINE CLINIC | Facility: CLINIC | Age: 47
End: 2022-06-15
Payer: COMMERCIAL

## 2022-06-15 VITALS
WEIGHT: 198 LBS | RESPIRATION RATE: 16 BRPM | OXYGEN SATURATION: 97 % | HEIGHT: 69 IN | DIASTOLIC BLOOD PRESSURE: 70 MMHG | TEMPERATURE: 98 F | BODY MASS INDEX: 29.33 KG/M2 | HEART RATE: 67 BPM | SYSTOLIC BLOOD PRESSURE: 118 MMHG

## 2022-06-15 DIAGNOSIS — Z00.00 ROUTINE MEDICAL EXAM: Primary | ICD-10-CM

## 2022-06-15 DIAGNOSIS — E05.00 GRAVES DISEASE: ICD-10-CM

## 2022-06-15 DIAGNOSIS — Z12.11 SCREENING FOR COLON CANCER: ICD-10-CM

## 2022-06-15 DIAGNOSIS — Z00.00 LABORATORY EXAM ORDERED AS PART OF ROUTINE GENERAL MEDICAL EXAMINATION: ICD-10-CM

## 2022-06-15 DIAGNOSIS — E05.90 HYPERTHYROIDISM: ICD-10-CM

## 2022-06-15 PROCEDURE — 3008F BODY MASS INDEX DOCD: CPT | Performed by: FAMILY MEDICINE

## 2022-06-15 PROCEDURE — 3078F DIAST BP <80 MM HG: CPT | Performed by: FAMILY MEDICINE

## 2022-06-15 PROCEDURE — 3074F SYST BP LT 130 MM HG: CPT | Performed by: FAMILY MEDICINE

## 2022-06-15 PROCEDURE — 99396 PREV VISIT EST AGE 40-64: CPT | Performed by: FAMILY MEDICINE

## 2022-06-15 RX ORDER — FLUTICASONE PROPIONATE 50 MCG
1 SPRAY, SUSPENSION (ML) NASAL DAILY
COMMUNITY
Start: 2021-12-08

## 2022-06-15 RX ORDER — FLUTICASONE FUROATE, UMECLIDINIUM BROMIDE AND VILANTEROL TRIFENATATE 100; 62.5; 25 UG/1; UG/1; UG/1
1 POWDER RESPIRATORY (INHALATION) EVERY MORNING
COMMUNITY
Start: 2022-06-02

## 2022-07-07 LAB
ABSOLUTE BASOPHILS: 42 CELLS/UL (ref 0–200)
ABSOLUTE EOSINOPHILS: 99 CELLS/UL (ref 15–500)
ABSOLUTE LYMPHOCYTES: 1898 CELLS/UL (ref 850–3900)
ABSOLUTE MONOCYTES: 421 CELLS/UL (ref 200–950)
ABSOLUTE NEUTROPHILS: 2740 CELLS/UL (ref 1500–7800)
ALBUMIN/GLOBULIN RATIO: 1.9 (CALC) (ref 1–2.5)
ALBUMIN: 4.7 G/DL (ref 3.6–5.1)
ALKALINE PHOSPHATASE: 58 U/L (ref 36–130)
ALT: 25 U/L (ref 9–46)
AST: 21 U/L (ref 10–40)
BASOPHILS: 0.8 %
BILIRUBIN, TOTAL: 1.3 MG/DL (ref 0.2–1.2)
BUN: 12 MG/DL (ref 7–25)
CALCIUM: 10.1 MG/DL (ref 8.6–10.3)
CARBON DIOXIDE: 29 MMOL/L (ref 20–32)
CHLORIDE: 101 MMOL/L (ref 98–110)
CHOL/HDLC RATIO: 3.3 (CALC)
CHOLESTEROL, TOTAL: 197 MG/DL
CREATININE: 1.05 MG/DL (ref 0.6–1.35)
EGFR IF AFRICN AM: 97 ML/MIN/1.73M2
EGFR IF NONAFRICN AM: 84 ML/MIN/1.73M2
EOSINOPHILS: 1.9 %
GLOBULIN: 2.5 G/DL (CALC) (ref 1.9–3.7)
GLUCOSE: 93 MG/DL (ref 65–99)
HDL CHOLESTEROL: 59 MG/DL
HEMATOCRIT: 47.5 % (ref 38.5–50)
HEMOGLOBIN: 16.4 G/DL (ref 13.2–17.1)
LDL-CHOLESTEROL: 112 MG/DL (CALC)
LYMPHOCYTES: 36.5 %
MCH: 29.3 PG (ref 27–33)
MCHC: 34.5 G/DL (ref 32–36)
MCV: 85 FL (ref 80–100)
MONOCYTES: 8.1 %
MPV: 9.3 FL (ref 7.5–12.5)
NEUTROPHILS: 52.7 %
NON-HDL CHOLESTEROL: 138 MG/DL (CALC)
PLATELET COUNT: 232 THOUSAND/UL (ref 140–400)
POTASSIUM: 4.3 MMOL/L (ref 3.5–5.3)
PROTEIN, TOTAL: 7.2 G/DL (ref 6.1–8.1)
RDW: 12.8 % (ref 11–15)
RED BLOOD CELL COUNT: 5.59 MILLION/UL (ref 4.2–5.8)
SODIUM: 139 MMOL/L (ref 135–146)
T4, FREE: 1.5 NG/DL (ref 0.8–1.8)
TRIGLYCERIDES: 146 MG/DL
TSH W/REFLEX TO FT4: 4.76 MIU/L (ref 0.4–4.5)
WHITE BLOOD CELL COUNT: 5.2 THOUSAND/UL (ref 3.8–10.8)

## 2023-06-19 PROBLEM — K57.30 DIVERTICULOSIS OF LARGE INTESTINE WITHOUT DIVERTICULITIS: Status: ACTIVE | Noted: 2023-06-19

## 2023-06-19 PROBLEM — Z12.11 SPECIAL SCREENING FOR MALIGNANT NEOPLASMS, COLON: Status: ACTIVE | Noted: 2023-06-19

## 2023-06-19 PROCEDURE — 88305 TISSUE EXAM BY PATHOLOGIST: CPT | Performed by: INTERNAL MEDICINE

## 2023-06-27 ENCOUNTER — OFFICE VISIT (OUTPATIENT)
Dept: FAMILY MEDICINE CLINIC | Facility: CLINIC | Age: 48
End: 2023-06-27
Payer: COMMERCIAL

## 2023-06-27 VITALS
HEART RATE: 60 BPM | WEIGHT: 193 LBS | BODY MASS INDEX: 29.25 KG/M2 | RESPIRATION RATE: 16 BRPM | SYSTOLIC BLOOD PRESSURE: 110 MMHG | OXYGEN SATURATION: 99 % | TEMPERATURE: 98 F | HEIGHT: 68 IN | DIASTOLIC BLOOD PRESSURE: 70 MMHG

## 2023-06-27 DIAGNOSIS — Z00.00 LABORATORY EXAM ORDERED AS PART OF ROUTINE GENERAL MEDICAL EXAMINATION: ICD-10-CM

## 2023-06-27 DIAGNOSIS — E05.90 HYPERTHYROIDISM: ICD-10-CM

## 2023-06-27 DIAGNOSIS — Z00.00 ROUTINE MEDICAL EXAM: Primary | ICD-10-CM

## 2023-06-27 DIAGNOSIS — J45.21 ASTHMATIC BRONCHITIS, MILD INTERMITTENT, WITH ACUTE EXACERBATION: ICD-10-CM

## 2023-06-27 PROBLEM — K57.30 DIVERTICULOSIS OF LARGE INTESTINE WITHOUT DIVERTICULITIS: Status: RESOLVED | Noted: 2023-06-19 | Resolved: 2023-06-27

## 2023-06-27 PROBLEM — Z12.11 SPECIAL SCREENING FOR MALIGNANT NEOPLASMS, COLON: Status: RESOLVED | Noted: 2023-06-19 | Resolved: 2023-06-27

## 2023-06-27 PROCEDURE — 3078F DIAST BP <80 MM HG: CPT | Performed by: FAMILY MEDICINE

## 2023-06-27 PROCEDURE — 3008F BODY MASS INDEX DOCD: CPT | Performed by: FAMILY MEDICINE

## 2023-06-27 PROCEDURE — 3074F SYST BP LT 130 MM HG: CPT | Performed by: FAMILY MEDICINE

## 2023-06-27 PROCEDURE — 99396 PREV VISIT EST AGE 40-64: CPT | Performed by: FAMILY MEDICINE

## 2023-06-27 RX ORDER — ALBUTEROL SULFATE 90 UG/1
1-2 AEROSOL, METERED RESPIRATORY (INHALATION) EVERY 4 HOURS PRN
Qty: 1 EACH | Refills: 0 | Status: SHIPPED | OUTPATIENT
Start: 2023-06-27

## 2023-07-03 ENCOUNTER — LAB ENCOUNTER (OUTPATIENT)
Dept: LAB | Age: 48
End: 2023-07-03
Attending: FAMILY MEDICINE
Payer: COMMERCIAL

## 2023-07-03 DIAGNOSIS — E05.90 HYPERTHYROIDISM: ICD-10-CM

## 2023-07-03 DIAGNOSIS — E05.00 BASEDOW'S DISEASE: Primary | ICD-10-CM

## 2023-07-03 DIAGNOSIS — Z00.00 LABORATORY EXAM ORDERED AS PART OF ROUTINE GENERAL MEDICAL EXAMINATION: ICD-10-CM

## 2023-07-03 LAB
ALBUMIN SERPL-MCNC: 4.1 G/DL (ref 3.4–5)
ALBUMIN/GLOB SERPL: 1.3 {RATIO} (ref 1–2)
ALP LIVER SERPL-CCNC: 55 U/L
ALT SERPL-CCNC: 36 U/L
ANION GAP SERPL CALC-SCNC: 6 MMOL/L (ref 0–18)
AST SERPL-CCNC: 22 U/L (ref 15–37)
BASOPHILS # BLD AUTO: 0.04 X10(3) UL (ref 0–0.2)
BASOPHILS NFR BLD AUTO: 0.9 %
BILIRUB SERPL-MCNC: 1.2 MG/DL (ref 0.1–2)
BUN BLD-MCNC: 14 MG/DL (ref 7–18)
CALCIUM BLD-MCNC: 9.1 MG/DL (ref 8.5–10.1)
CHLORIDE SERPL-SCNC: 106 MMOL/L (ref 98–112)
CHOLEST SERPL-MCNC: 219 MG/DL (ref ?–200)
CO2 SERPL-SCNC: 26 MMOL/L (ref 21–32)
CREAT BLD-MCNC: 1.14 MG/DL
EOSINOPHIL # BLD AUTO: 0.11 X10(3) UL (ref 0–0.7)
EOSINOPHIL NFR BLD AUTO: 2.4 %
ERYTHROCYTE [DISTWIDTH] IN BLOOD BY AUTOMATED COUNT: 12.7 %
FASTING PATIENT LIPID ANSWER: YES
FASTING STATUS PATIENT QL REPORTED: YES
GFR SERPLBLD BASED ON 1.73 SQ M-ARVRAT: 79 ML/MIN/1.73M2 (ref 60–?)
GLOBULIN PLAS-MCNC: 3.1 G/DL (ref 2.8–4.4)
GLUCOSE BLD-MCNC: 104 MG/DL (ref 70–99)
HCT VFR BLD AUTO: 46.6 %
HDLC SERPL-MCNC: 60 MG/DL (ref 40–59)
HGB BLD-MCNC: 16.1 G/DL
IMM GRANULOCYTES # BLD AUTO: 0.01 X10(3) UL (ref 0–1)
IMM GRANULOCYTES NFR BLD: 0.2 %
LDLC SERPL CALC-MCNC: 134 MG/DL (ref ?–100)
LYMPHOCYTES # BLD AUTO: 2.14 X10(3) UL (ref 1–4)
LYMPHOCYTES NFR BLD AUTO: 47.2 %
MCH RBC QN AUTO: 29.6 PG (ref 26–34)
MCHC RBC AUTO-ENTMCNC: 34.5 G/DL (ref 31–37)
MCV RBC AUTO: 85.7 FL
MONOCYTES # BLD AUTO: 0.35 X10(3) UL (ref 0.1–1)
MONOCYTES NFR BLD AUTO: 7.7 %
NEUTROPHILS # BLD AUTO: 1.88 X10 (3) UL (ref 1.5–7.7)
NEUTROPHILS # BLD AUTO: 1.88 X10(3) UL (ref 1.5–7.7)
NEUTROPHILS NFR BLD AUTO: 41.6 %
NONHDLC SERPL-MCNC: 159 MG/DL (ref ?–130)
OSMOLALITY SERPL CALC.SUM OF ELEC: 287 MOSM/KG (ref 275–295)
PLATELET # BLD AUTO: 239 10(3)UL (ref 150–450)
POTASSIUM SERPL-SCNC: 3.8 MMOL/L (ref 3.5–5.1)
PROT SERPL-MCNC: 7.2 G/DL (ref 6.4–8.2)
RBC # BLD AUTO: 5.44 X10(6)UL
SODIUM SERPL-SCNC: 138 MMOL/L (ref 136–145)
T4 FREE SERPL-MCNC: 0.8 NG/DL (ref 0.8–1.7)
TRIGL SERPL-MCNC: 143 MG/DL (ref 30–149)
TSI SER-ACNC: 13.1 MIU/ML (ref 0.36–3.74)
VLDLC SERPL CALC-MCNC: 26 MG/DL (ref 0–30)
WBC # BLD AUTO: 4.5 X10(3) UL (ref 4–11)

## 2023-07-03 PROCEDURE — 80061 LIPID PANEL: CPT | Performed by: FAMILY MEDICINE

## 2023-07-03 PROCEDURE — 80050 GENERAL HEALTH PANEL: CPT | Performed by: FAMILY MEDICINE

## 2023-07-03 PROCEDURE — 84439 ASSAY OF FREE THYROXINE: CPT | Performed by: FAMILY MEDICINE

## 2023-08-09 ENCOUNTER — LAB ENCOUNTER (OUTPATIENT)
Dept: LAB | Age: 48
End: 2023-08-09
Attending: INTERNAL MEDICINE
Payer: COMMERCIAL

## 2023-08-09 DIAGNOSIS — E05.00 BASEDOW'S DISEASE: Primary | ICD-10-CM

## 2023-08-09 LAB
T4 FREE SERPL-MCNC: 0.8 NG/DL (ref 0.8–1.7)
TSI SER-ACNC: 6.73 MIU/ML (ref 0.36–3.74)

## 2023-08-09 PROCEDURE — 84443 ASSAY THYROID STIM HORMONE: CPT

## 2023-08-09 PROCEDURE — 36415 COLL VENOUS BLD VENIPUNCTURE: CPT

## 2023-08-09 PROCEDURE — 84439 ASSAY OF FREE THYROXINE: CPT

## 2023-11-15 ENCOUNTER — OFFICE VISIT (OUTPATIENT)
Dept: FAMILY MEDICINE CLINIC | Facility: CLINIC | Age: 48
End: 2023-11-15
Payer: COMMERCIAL

## 2023-11-15 VITALS
SYSTOLIC BLOOD PRESSURE: 127 MMHG | TEMPERATURE: 99 F | HEART RATE: 93 BPM | RESPIRATION RATE: 18 BRPM | OXYGEN SATURATION: 99 % | DIASTOLIC BLOOD PRESSURE: 87 MMHG

## 2023-11-15 DIAGNOSIS — R05.1 ACUTE COUGH: Primary | ICD-10-CM

## 2023-11-15 DIAGNOSIS — J06.9 VIRAL UPPER RESPIRATORY ILLNESS: ICD-10-CM

## 2023-11-15 DIAGNOSIS — J02.9 SORE THROAT: ICD-10-CM

## 2023-11-15 LAB
CONTROL LINE PRESENT WITH A CLEAR BACKGROUND (YES/NO): YES YES/NO
KIT LOT #: NORMAL NUMERIC
STREP GRP A CUL-SCR: NEGATIVE

## 2023-11-15 PROCEDURE — 87880 STREP A ASSAY W/OPTIC: CPT | Performed by: NURSE PRACTITIONER

## 2023-11-15 PROCEDURE — 99213 OFFICE O/P EST LOW 20 MIN: CPT | Performed by: NURSE PRACTITIONER

## 2023-11-15 PROCEDURE — 87637 SARSCOV2&INF A&B&RSV AMP PRB: CPT | Performed by: NURSE PRACTITIONER

## 2023-11-15 PROCEDURE — 3074F SYST BP LT 130 MM HG: CPT | Performed by: NURSE PRACTITIONER

## 2023-11-15 PROCEDURE — 3079F DIAST BP 80-89 MM HG: CPT | Performed by: NURSE PRACTITIONER

## 2023-11-15 RX ORDER — BENZONATATE 200 MG/1
200 CAPSULE ORAL 3 TIMES DAILY PRN
Qty: 30 CAPSULE | Refills: 0 | Status: SHIPPED | OUTPATIENT
Start: 2023-11-15 | End: 2023-11-25

## 2023-11-16 LAB
FLUAV + FLUBV RNA SPEC NAA+PROBE: NOT DETECTED
FLUAV + FLUBV RNA SPEC NAA+PROBE: NOT DETECTED
RSV RNA SPEC NAA+PROBE: NOT DETECTED
SARS-COV-2 RNA RESP QL NAA+PROBE: NOT DETECTED

## 2023-12-01 ENCOUNTER — OFFICE VISIT (OUTPATIENT)
Dept: FAMILY MEDICINE CLINIC | Facility: CLINIC | Age: 48
End: 2023-12-01
Payer: COMMERCIAL

## 2023-12-01 VITALS
BODY MASS INDEX: 31.95 KG/M2 | RESPIRATION RATE: 18 BRPM | HEART RATE: 78 BPM | DIASTOLIC BLOOD PRESSURE: 70 MMHG | TEMPERATURE: 98 F | OXYGEN SATURATION: 97 % | WEIGHT: 210.81 LBS | HEIGHT: 68 IN | SYSTOLIC BLOOD PRESSURE: 130 MMHG

## 2023-12-01 DIAGNOSIS — R05.2 SUBACUTE COUGH: ICD-10-CM

## 2023-12-01 DIAGNOSIS — J40 BRONCHITIS WITH ACUTE WHEEZING: Primary | ICD-10-CM

## 2023-12-01 PROCEDURE — 3008F BODY MASS INDEX DOCD: CPT | Performed by: FAMILY MEDICINE

## 2023-12-01 PROCEDURE — 3078F DIAST BP <80 MM HG: CPT | Performed by: FAMILY MEDICINE

## 2023-12-01 PROCEDURE — 3075F SYST BP GE 130 - 139MM HG: CPT | Performed by: FAMILY MEDICINE

## 2023-12-01 PROCEDURE — 99213 OFFICE O/P EST LOW 20 MIN: CPT | Performed by: FAMILY MEDICINE

## 2023-12-01 RX ORDER — AZITHROMYCIN 250 MG/1
TABLET, FILM COATED ORAL
Qty: 6 TABLET | Refills: 0 | Status: SHIPPED | OUTPATIENT
Start: 2023-12-01 | End: 2023-12-05

## 2023-12-01 RX ORDER — METHYLPREDNISOLONE 4 MG/1
TABLET ORAL
Qty: 21 EACH | Refills: 0 | Status: SHIPPED | OUTPATIENT
Start: 2023-12-01

## 2023-12-01 RX ORDER — DEXTROMETHORPHAN HYDROBROMIDE AND PROMETHAZINE HYDROCHLORIDE 15; 6.25 MG/5ML; MG/5ML
5 SYRUP ORAL 4 TIMES DAILY PRN
Qty: 280 ML | Refills: 0 | Status: SHIPPED | OUTPATIENT
Start: 2023-12-01 | End: 2023-12-15

## 2023-12-01 RX ORDER — ALBUTEROL SULFATE 90 UG/1
2 AEROSOL, METERED RESPIRATORY (INHALATION) EVERY 4 HOURS PRN
Qty: 8 G | Refills: 0 | Status: SHIPPED | OUTPATIENT
Start: 2023-12-01

## 2024-01-10 ENCOUNTER — TELEMEDICINE (OUTPATIENT)
Dept: FAMILY MEDICINE CLINIC | Facility: CLINIC | Age: 49
End: 2024-01-10
Payer: COMMERCIAL

## 2024-01-10 DIAGNOSIS — R10.9 ABDOMINAL CRAMPING: ICD-10-CM

## 2024-01-10 DIAGNOSIS — R19.7 DIARRHEA, UNSPECIFIED TYPE: ICD-10-CM

## 2024-01-10 DIAGNOSIS — A09 TRAVELER'S DIARRHEA: Primary | ICD-10-CM

## 2024-01-10 PROCEDURE — 99214 OFFICE O/P EST MOD 30 MIN: CPT | Performed by: FAMILY MEDICINE

## 2024-01-10 RX ORDER — AZITHROMYCIN 250 MG/1
1000 TABLET, FILM COATED ORAL DAILY
Qty: 12 TABLET | Refills: 0 | Status: SHIPPED | OUTPATIENT
Start: 2024-01-10 | End: 2024-01-13

## 2024-01-10 RX ORDER — DICYCLOMINE HYDROCHLORIDE 10 MG/1
10 CAPSULE ORAL 4 TIMES DAILY
Qty: 40 CAPSULE | Refills: 0 | Status: SHIPPED | OUTPATIENT
Start: 2024-01-10 | End: 2024-01-20

## 2024-01-10 RX ORDER — ONDANSETRON 4 MG/1
4 TABLET, FILM COATED ORAL EVERY 8 HOURS PRN
Qty: 20 TABLET | Refills: 2 | Status: SHIPPED | OUTPATIENT
Start: 2024-01-10

## 2024-01-10 NOTE — PROGRESS NOTES
This is a telemedicine visit with live, interactive video and/or audio.     Patient understands and accepts financial responsibility for any deductible, co-insurance and/or co-pays associated with this service.    SUBJECTIVE  This is a 48 year old male patient has abdominal cramping, nausea, vomiting, diarrhea, inability to eat without having to use the toilet and have a very urgent bowel movement.     Results for orders placed or performed in visit on 11/15/23   Strep A Assay W/Optic   Result Value Ref Range    Strep Grp A Screen negative Negative    Control Line Present with a clear background (yes/no) yes Yes/No    Kit Lot # [id number] Numeric    Kit Expiration Date 03/02/2025 Date   SARS-CoV-2/Flu A and B/RSV by PCR ([facility]) [E]    Specimen: Nares; Other   Result Value Ref Range    SARS-CoV-2 (COVID-19)- ([facility]) Not Detected Not Detected    Influenza A by PCR Not Detected Not Detected    Influenza B by PCR Not Detected Not Detected    RSV by PCR Not Detected Not Detected       HISTORY:  Past Medical History:   Diagnosis Date    Bleeding nose 10 plus years    working with dentist on receeding gum line    Blood in the stool Since late teens    From time to time associated with Hemmoroids    Body piercing At 16 years old    Ears pierced only    Constipation Since early 20's    An issue from time to time    COVID-19 virus infection     Decorative tattoo At 20 years old    None since    Genital warts     GERD (gastroesophageal reflux disease)     Heartburn 10+ years ago    Has not been an issue since    Hemorrhoids 10-20 years ago    Has not been an issue recently    Hyperthyroidism     [facility]    Moderate persistent asthma     Triggers: allergies, exercise    Seasonal allergies     Dr. Ryan allergist    Shortness of breath 5-10 years ago    Asthma    Varicocele     Bilat    Wears glasses Since teens    Both glasses and contacts      Past Surgical History:   Procedure Laterality Date    COLONOSCOPY   06/19/2023    normal, but repeat 5 yrs due to fam hx but really was prep quality, Dr. Luther, [facility]    [place] TEETH REMOVED  1991      Family History   Problem Relation Age of Onset    Diabetes Mother     No Known Problems Father     No Known Problems Sister     No Known Problems Sister     No Known Problems Brother     No Known Problems Maternal Grandmother     Other (Cancer, unknown) Maternal Grandfather     Other (lung cancer) Paternal Grandmother     Colon Cancer Paternal Grandfather         70's    Arthritis Paternal Grandfather     Stroke Paternal Grandfather     Other (Cancer, stomach/liver) Paternal Grandfather       Social History     Socioeconomic History    Marital status:     Number of children: 4   Tobacco Use    Smoking status: Never    Smokeless tobacco: Never    Tobacco comments:     Never   Vaping Use    Vaping Use: Never used   Substance and Sexual Activity    Alcohol use: Yes     Alcohol/week: 3.0 standard drinks of alcohol     Types: 3 Cans of beer per week     Comment: 1 beer 2x/wk, never a problem    Drug use: Never    Sexual activity: Yes     Partners: Female     Birth control/protection: Tubal Ligation   Other Topics Concern    Caffeine Concern Yes        Allergies   Allergen Reactions    Sulfa Antibiotics HIVES    Penicillins HIVES    Seasonal OTHER (SEE COMMENTS)     Runny nose/sneezing/cough/watery eyes      Current Outpatient Medications   Medication Sig Dispense Refill    azithromycin 250 MG Oral Tab Take 4 tablets (1,000 mg total) by mouth daily for 3 days. For travelers diarrhea. 12 tablet 0    dicyclomine 10 MG Oral Cap Take 1 capsule (10 mg total) by mouth 4 (four) times daily for 10 days. 40 capsule 0    ondansetron (ZOFRAN) 4 mg tablet Take 1 tablet (4 mg total) by mouth every 8 (eight) hours as needed for Nausea. 20 tablet 2    methylPREDNISolone (MEDROL) 4 MG Oral Tablet Therapy Pack As directed. 21 each 0    albuterol 108 (90 Base) MCG/ACT Inhalation Aero Soln  Inhale 2 puffs into the lungs every 4 (four) hours as needed for Wheezing or Shortness of Breath (cough). 8 g 0    albuterol 108 (90 Base) MCG/ACT Inhalation Aero Soln Inhale 1-2 puffs into the lungs every 4 (four) hours as needed for Wheezing (cough). 1 each 0    TRELEGY ELLIPTA 100-62.5-25 MCG/INH Inhalation Aerosol Powder, Breath Activated Inhale 1 puff into the lungs every morning.      fluticasone propionate 50 MCG/ACT Nasal Suspension 1 spray by Nasal route daily.      methIMAzole 5 MG Oral Tab TAKE 1 TABLET BY MOUTH ONE TIME A DAY (Patient taking differently: Take 0.5 tablets (2.5 mg total) by mouth daily. TAKE 1 TABLET BY MOUTH ONE TIME A DAY) 90 tablet 3    Azelastine HCl 0.1 % Nasal Solution INSTILL 1 OR 2 SPRAYS NASALLY TWO TIMES A DAY AS NEEDED FOR RUNNY NOSE      montelukast 10 MG Oral Tab Take 1 tablet (10 mg total) by mouth nightly.      Fexofenadine HCl (ALLEGRA OR) Take 1 tablet by mouth daily.           OBJECTIVE  Physical Exam:   Speaking in full sentences comfortably and Normal work of breathing    ASSESSMENT & PLAN  Diagnoses and all orders for this visit:    Traveler's diarrhea  -     azithromycin 250 MG Oral Tab; Take 4 tablets (1,000 mg total) by mouth daily for 3 days. For travelers diarrhea.  -     ondansetron (ZOFRAN) 4 mg tablet; Take 1 tablet (4 mg total) by mouth every 8 (eight) hours as needed for Nausea.    Diarrhea, unspecified type  -     dicyclomine 10 MG Oral Cap; Take 1 capsule (10 mg total) by mouth 4 (four) times daily for 10 days.    Abdominal cramping  -     dicyclomine 10 MG Oral Cap; Take 1 capsule (10 mg total) by mouth 4 (four) times daily for 10 days.        Follow Up:  No follow-ups on file.      Plan discussed with patient. All questions answered. Patient is agreeable to this plan of care.     Time spent on telephone call and encounter 20 min.       VITOR Avila

## 2024-07-02 ENCOUNTER — OFFICE VISIT (OUTPATIENT)
Dept: FAMILY MEDICINE CLINIC | Facility: CLINIC | Age: 49
End: 2024-07-02
Payer: COMMERCIAL

## 2024-07-02 VITALS
OXYGEN SATURATION: 98 % | RESPIRATION RATE: 16 BRPM | BODY MASS INDEX: 30.92 KG/M2 | SYSTOLIC BLOOD PRESSURE: 120 MMHG | WEIGHT: 204 LBS | TEMPERATURE: 98 F | HEART RATE: 64 BPM | DIASTOLIC BLOOD PRESSURE: 70 MMHG | HEIGHT: 68 IN

## 2024-07-02 DIAGNOSIS — Z00.00 LABORATORY EXAM ORDERED AS PART OF ROUTINE GENERAL MEDICAL EXAMINATION: ICD-10-CM

## 2024-07-02 DIAGNOSIS — E05.90 HYPERTHYROIDISM: ICD-10-CM

## 2024-07-02 DIAGNOSIS — Z00.00 ROUTINE MEDICAL EXAM: Primary | ICD-10-CM

## 2024-07-02 DIAGNOSIS — Z23 NEED FOR VACCINATION: ICD-10-CM

## 2024-07-02 DIAGNOSIS — Z12.5 SCREENING PSA (PROSTATE SPECIFIC ANTIGEN): ICD-10-CM

## 2024-07-02 PROCEDURE — 90714 TD VACC NO PRESV 7 YRS+ IM: CPT | Performed by: FAMILY MEDICINE

## 2024-07-02 PROCEDURE — 90471 IMMUNIZATION ADMIN: CPT | Performed by: FAMILY MEDICINE

## 2024-07-02 PROCEDURE — 3074F SYST BP LT 130 MM HG: CPT | Performed by: FAMILY MEDICINE

## 2024-07-02 PROCEDURE — 3008F BODY MASS INDEX DOCD: CPT | Performed by: FAMILY MEDICINE

## 2024-07-02 PROCEDURE — 3078F DIAST BP <80 MM HG: CPT | Performed by: FAMILY MEDICINE

## 2024-07-02 PROCEDURE — 99396 PREV VISIT EST AGE 40-64: CPT | Performed by: FAMILY MEDICINE

## 2024-07-02 NOTE — PROGRESS NOTES
No chief complaint on file.      HPI:  Ki Wayne III is a 48 year old male here today for preventative visit.      Imms- Discussed COVID, Td, & Shingrix.        Prostate cancer screening- No known family h/o prostate cancer. Nocturia 0x/night.    Denies- hesitancy, urinary frequency, dribbling         Colon cancer screening- PGpa with colon cancer.  Has some wipe type bleeding on occasion and h/o hemorrhoids. C-scope normal on 6/19/23 with Dr. Luther, [facility]. Repeat 5 yrs due to family hx in operative report, but wouldn't qualify, then said he was told 3-5 yrs due to prep quality. So will go with 5 yrs.      Denies- wt loss, abd pain, change in stool caliber        Diet/exercise- working on this. Gained 11# and not eating as well.         Dental/Eye Check up-  Recommended pt see dentist once every 6 months for a cleaning and once every year for an eye exam.            Asthma, moderate intermittent- uses albuterol inhaler 4-5 times a years or less, but always uses Trellegy Ellipta 1 puff q AM. Sees Dr. Ryan, his allergist who he gets his med refills through (singulair, allegra, azelastine, Trellegy Ellipta, and albuterol).   Triggers: allergies, exercise.        Hyperthyroidism, Graves-  Sees Dr. Ramsey 1/yr now, but gets labs 1 q 6 months as he's been doing well. Initially had an US done and per pt told it was ok. No longer taking methimazole since ~2023.           Past Medical History:    Bleeding nose    working with dentist on receeding gum line    Blood in the stool    From time to time associated with Hemmoroids    Body piercing    Ears pierced only    Constipation    An issue from time to time    Decorative tattoo    None since    Genital warts    GERD (gastroesophageal reflux disease)    Heartburn    Has not been an issue since    Hemorrhoids    Has not been an issue recently    Hyperthyroidism    Grave's, Dr. Ramsey    Moderate persistent asthma (HCC)    Triggers: allergies, exercise    Seasonal  allergies    Dr. Ryan allergist    Varicocele    Bilat    Wears glasses    Both glasses and contacts     Past Surgical History:   Procedure Laterality Date    Colonoscopy  06/19/2023    normal, but repeat 5 yrs due to fam hx but really was prep quality, Dr. Luther, [facility]    [place] teeth removed  1991     Current Outpatient Medications on File Prior to Visit   Medication Sig Dispense Refill    albuterol 108 (90 Base) MCG/ACT Inhalation Aero Soln Inhale 2 puffs into the lungs every 4 (four) hours as needed for Wheezing or Shortness of Breath (cough). 8 g 0    TRELEGY ELLIPTA 100-62.5-25 MCG/INH Inhalation Aerosol Powder, Breath Activated Inhale 1 puff into the lungs every morning. Leonova, Nikki, APRN      fluticasone propionate 50 MCG/ACT Nasal Suspension 1 spray by Nasal route daily.      Azelastine HCl 0.1 % Nasal Solution Leonova, Nikki, APRN      montelukast 10 MG Oral Tab Take 1 tablet (10 mg total) by mouth nightly. Leonova, Nikki, APRN      Fexofenadine HCl (ALLEGRA OR) Take 1 tablet by mouth daily.         No current facility-administered medications on file prior to visit.     Allergies   Allergen Reactions    Sulfa Antibiotics HIVES    Penicillins HIVES    Seasonal OTHER (SEE COMMENTS)     Runny nose/sneezing/cough/watery eyes     Social History     Socioeconomic History    Marital status:      Spouse name: Not on file    Number of children: 4    Years of education: Not on file    Highest education level: Not on file   Occupational History    Not on file   Tobacco Use    Smoking status: Never    Smokeless tobacco: Never    Tobacco comments:     Never   Vaping Use    Vaping status: Never Used   Substance and Sexual Activity    Alcohol use: Yes     Alcohol/week: 5.0 standard drinks of alcohol     Types: 5 Cans of beer per week     Comment: 4-5 beers/wk in the summer, never a problem    Drug use: Never    Sexual activity: Yes     Partners: Female     Birth control/protection: Tubal Ligation    Other Topics Concern     Service Not Asked    Blood Transfusions Not Asked    Caffeine Concern Yes    Occupational Exposure Not Asked    Hobby Hazards Not Asked    Sleep Concern Not Asked    Stress Concern Not Asked    Weight Concern Not Asked    Special Diet Not Asked    Back Care Not Asked    Exercise Not Asked    Bike Helmet Not Asked    Seat Belt Not Asked    Self-Exams Not Asked   Social History Narrative    Not on file     Social Determinants of Health     Financial Resource Strain: Not on file   Food Insecurity: Not on file   Transportation Needs: Not on file   Physical Activity: Not on file   Stress: Not on file   Social Connections: Not on file   Housing Stability: Not on file     Family History   Problem Relation Age of Onset    Diabetes Mother     No Known Problems Father     No Known Problems Sister     No Known Problems Sister     No Known Problems Brother     No Known Problems Maternal Grandmother     Other (Cancer, unknown) Maternal Grandfather     Other (lung cancer) Paternal Grandmother     Colon Cancer Paternal Grandfather         70's    Arthritis Paternal Grandfather     Stroke Paternal Grandfather     Other (Cancer, stomach/liver) Paternal Grandfather        Review of Systems - All systems reviewed and negative except for HPI    PHYSICAL EXAM:  /70   Pulse 64   Temp 97.9 °F (36.6 °C) (Temporal)   Resp 16   Ht 5' 8\" (1.727 m)   Wt 204 lb (92.5 kg)   SpO2 98%   BMI 31.02 kg/m²   GENERAL APPEARANCE:  Alert, no acute distress, appears stated age  HEENT:  Head- Normocephalic, atraumatic.    Eyes- Extraocular movements intact, pupils equally round and reactive to light,  conjunctivae normal.    Ears- Tympanic membranes intact bilaterally.    Nose- Patent, normal septum and turbinates.    Mouth/Throat- Normal oral mucosa, throat non-erythematous.  NECK:  No submental, submandibular, ant/post cervical lymphadenopathy. No thyromegaly or masses.  PULMONARY:  Lungs clear to  auscultation bilaterally. No wheezes, rales, or rhonchi. Normal respiratory effort.  CARDIOVASCULAR:  Regular rate and rhythm. No murmurs, gallops, or rubs.  ABDOMEN:  + bowel sounds, soft, nontender, nondistended. No hepatomegaly.  MUSCULOSKELETAL: Strength of upper and lower extremities 5/5 bilaterally. Normal gait.  NEUROLOGIC:  Cranial nerves 2-12 grossly intact.  PSYCHIATRIC:  Normal mood, affect, and hygiene.       ACT 25/25    ASSESSMENT/PLAN:    1. Routine medical exam    2. Screening PSA (prostate specific antigen)  - PSA - Total [5363][Q]    3. Laboratory exam ordered as part of routine general medical examination  - CBC [6399] [Q]  - COMP METABOLIC PANEL [60620] [Q]  - LIPID PANEL [7600] [Q]    4. Need for vaccination  - Immunization Admin Counseling, 1st Component, 18 years and older  - Immunization Admin Counseling, Additional Component, 18 years and older  - Td (Tenivac/Decavac) (>7 Years)    5. Hyperthyroidism  -cont to f/u with Dr. Ramsey      Patient verbalized understanding and agrees to plan.      Return in about 1 year (around 7/2/2025) for annual physical, we will contact you with results.

## 2024-07-09 ENCOUNTER — TELEPHONE (OUTPATIENT)
Dept: FAMILY MEDICINE CLINIC | Facility: CLINIC | Age: 49
End: 2024-07-09

## 2024-07-09 DIAGNOSIS — Z23 NEED FOR SHINGLES VACCINE: Primary | ICD-10-CM

## 2024-07-09 LAB
ABSOLUTE BASOPHILS: 28 CELLS/UL (ref 0–200)
ABSOLUTE EOSINOPHILS: 78 CELLS/UL (ref 15–500)
ABSOLUTE LYMPHOCYTES: 1835 CELLS/UL (ref 850–3900)
ABSOLUTE MONOCYTES: 386 CELLS/UL (ref 200–950)
ABSOLUTE NEUTROPHILS: 2272 CELLS/UL (ref 1500–7800)
ALBUMIN/GLOBULIN RATIO: 1.6 (CALC) (ref 1–2.5)
ALBUMIN: 4.4 G/DL (ref 3.6–5.1)
ALKALINE PHOSPHATASE: 52 U/L (ref 35–144)
ALT: 36 U/L (ref 9–46)
AST: 23 U/L (ref 10–35)
BASOPHILS: 0.6 %
BILIRUBIN, TOTAL: 1.4 MG/DL (ref 0.2–1.2)
BUN: 15 MG/DL (ref 7–25)
CALCIUM: 9.6 MG/DL (ref 8.6–10.3)
CARBON DIOXIDE: 26 MMOL/L (ref 20–32)
CHLORIDE: 103 MMOL/L (ref 98–110)
CHOL/HDLC RATIO: 3.9 (CALC)
CHOLESTEROL, TOTAL: 212 MG/DL
CREATININE: 1.12 MG/DL (ref 0.7–1.3)
EGFR: 80 ML/MIN/1.73M2
EOSINOPHILS: 1.7 %
GLOBULIN: 2.7 G/DL (CALC) (ref 1.9–3.7)
GLUCOSE: 98 MG/DL (ref 65–99)
HDL CHOLESTEROL: 54 MG/DL
HEMATOCRIT: 45.9 % (ref 38.5–50)
HEMOGLOBIN: 15.7 G/DL (ref 13.2–17.1)
LDL-CHOLESTEROL: 131 MG/DL (CALC)
LYMPHOCYTES: 39.9 %
MCH: 29 PG (ref 27–33)
MCHC: 34.2 G/DL (ref 32–36)
MCV: 84.7 FL (ref 80–100)
MONOCYTES: 8.4 %
MPV: 9.2 FL (ref 7.5–12.5)
NEUTROPHILS: 49.4 %
NON-HDL CHOLESTEROL: 158 MG/DL (CALC)
PLATELET COUNT: 251 THOUSAND/UL (ref 140–400)
POTASSIUM: 4.7 MMOL/L (ref 3.5–5.3)
PROTEIN, TOTAL: 7.1 G/DL (ref 6.1–8.1)
PSA, TOTAL: 0.78 NG/ML
RDW: 13.2 % (ref 11–15)
RED BLOOD CELL COUNT: 5.42 MILLION/UL (ref 4.2–5.8)
SODIUM: 139 MMOL/L (ref 135–146)
TRIGLYCERIDES: 153 MG/DL
WHITE BLOOD CELL COUNT: 4.6 THOUSAND/UL (ref 3.8–10.8)

## 2024-07-09 NOTE — TELEPHONE ENCOUNTER
Patient was told by Dr Neal to check with his insurance to see if he can have the shingles shot here, he needs the code and if it would be coded as preventive wellness?

## 2025-07-03 ENCOUNTER — OFFICE VISIT (OUTPATIENT)
Dept: FAMILY MEDICINE CLINIC | Facility: CLINIC | Age: 50
End: 2025-07-03
Payer: COMMERCIAL

## 2025-07-03 VITALS
BODY MASS INDEX: 31.83 KG/M2 | DIASTOLIC BLOOD PRESSURE: 70 MMHG | HEART RATE: 62 BPM | OXYGEN SATURATION: 98 % | SYSTOLIC BLOOD PRESSURE: 112 MMHG | WEIGHT: 210 LBS | TEMPERATURE: 98 F | RESPIRATION RATE: 16 BRPM | HEIGHT: 68 IN

## 2025-07-03 DIAGNOSIS — E05.90 HYPERTHYROIDISM: ICD-10-CM

## 2025-07-03 DIAGNOSIS — Z00.00 ROUTINE MEDICAL EXAM: Primary | ICD-10-CM

## 2025-07-03 DIAGNOSIS — Z00.00 LABORATORY EXAM ORDERED AS PART OF ROUTINE GENERAL MEDICAL EXAMINATION: ICD-10-CM

## 2025-07-03 DIAGNOSIS — Z12.5 SCREENING PSA (PROSTATE SPECIFIC ANTIGEN): ICD-10-CM

## 2025-07-03 NOTE — PROGRESS NOTES
Chief Complaint   Patient presents with    Physical       HPI:  Ki Wayne III is a 49 year old male here today for preventative visit.       Imms- up to date with both Shingrix & Tdap. Discussed COVID.        Prostate cancer screening- No known family h/o prostate cancer. Nocturia 0x/night. PSA 0.78 on 7/8/24. Thinks he did the VICKY in 2024     Denies- hesitancy, urinary frequency, dribbling           Colon cancer screening- PGpa with colon cancer.  Has some wipe type bleeding on occasion and h/o hemorrhoids. C-scope normal on 6/19/23 with Dr. Luther, [facility]. Repeat 5 yrs due to family hx in operative report, but wouldn't qualify, then said he was told 3-5 yrs due to prep quality. So will go with 5 yrs.      Denies- wt loss, abd pain, change in stool caliber        Diet/exercise- working on this. Gained 11# and not eating as well.         Dental/Eye Check up-  Recommended pt see dentist once every 6 months for a cleaning and once every year for an eye exam.            Asthma, moderate intermittent- uses albuterol inhaler 4-5 times a years or less, but always uses Trellegy Ellipta 1 puff q AM. Sees Dr. Ryan, his allergist 1 q 6mo who he gets his med refills through (singulair, allegra, azelastine & fluticasone nasal sprays, Trellegy Ellipta, and albuterol).   Triggers: allergies, exercise.        Hyperthyroidism, Graves-  Sees Dr. Ramsey 1/yr now, but gets labs 1 q 6-12 months as he's been doing well. Initially had an US done and per pt told it was ok. No longer taking methimazole since ~2023.         Past Medical History[1]  Past Surgical History[2]  Medications Ordered Prior to Encounter[3]  Allergies[4]  Social History     Socioeconomic History    Marital status:      Spouse name: Not on file    Number of children: 4    Years of education: Not on file    Highest education level: Not on file   Occupational History    Not on file   Tobacco Use    Smoking status: Never    Smokeless tobacco: Never     Tobacco comments:     Never   Vaping Use    Vaping status: Never Used   Substance and Sexual Activity    Alcohol use: Yes     Alcohol/week: 5.0 standard drinks of alcohol     Types: 5 Cans of beer per week     Comment: 4-5 beers/wk in the summer, never a problem    Drug use: Never    Sexual activity: Yes     Partners: Female     Birth control/protection: Tubal Ligation   Other Topics Concern     Service Not Asked    Blood Transfusions Not Asked    Caffeine Concern Yes    Occupational Exposure Not Asked    Hobby Hazards Not Asked    Sleep Concern Not Asked    Stress Concern Not Asked    Weight Concern Not Asked    Special Diet Not Asked    Back Care Not Asked    Exercise Not Asked    Bike Helmet Not Asked    Seat Belt Not Asked    Self-Exams Not Asked   Social History Narrative    Not on file     Social Drivers of Health     Food Insecurity: No Food Insecurity (7/3/2025)    NCSS - Food Insecurity     Worried About Running Out of Food in the Last Year: No     Ran Out of Food in the Last Year: No   Transportation Needs: No Transportation Needs (7/3/2025)    NCSS - Transportation     Lack of Transportation: No   Stress: Not on file   Housing Stability: Not At Risk (7/3/2025)    NCSS - Housing/Utilities     Has Housing: Yes     Worried About Losing Housing: No     Unable to Get Utilities: No     Family History[5]    Review of Systems - All systems reviewed and negative except for HPI    PHYSICAL EXAM:  /70   Pulse 62   Temp 97.9 °F (36.6 °C) (Temporal)   Resp 16   Ht 5' 8\" (1.727 m)   Wt 210 lb (95.3 kg)   SpO2 98%   BMI 31.93 kg/m²   GENERAL APPEARANCE:  Alert, no acute distress, appears stated age  HEENT:  Head- Normocephalic, atraumatic.    Eyes- Extraocular movements intact, pupils equally round and reactive to light,  conjunctivae normal.    Ears- Tympanic membranes intact bilaterally.    Nose- Patent, normal septum and turbinates.    Mouth/Throat- Normal oral mucosa, throat  non-erythematous.  NECK:  No submental, submandibular, ant/post cervical lymphadenopathy. No thyromegaly or masses.  PULMONARY:  Lungs clear to auscultation bilaterally. No wheezes, rales, or rhonchi. Normal respiratory effort.  CARDIOVASCULAR:  Regular rate and rhythm. No murmurs, gallops, or rubs.  ABDOMEN:  + bowel sounds, soft, nontender, nondistended. No hepatomegaly.  MUSCULOSKELETAL: Strength of upper and lower extremities 5/5 bilaterally. Normal gait.  NEUROLOGIC:  Cranial nerves 2-12 grossly intact.  PSYCHIATRIC:  Normal mood, affect, and hygiene.   : declined    ASSESSMENT/PLAN:    1. Routine medical exam    2. Screening PSA (prostate specific antigen)  - PSA - Total [5363][Q]    3. Laboratory exam ordered as part of routine general medical examination  - CBC [6399] [Q]  - COMP METABOLIC PANEL [96272] [Q]  - LIPID PANEL [7600] [Q]    4. Hyperthyroidism  -cont to f/u with endo      Patient verbalized understanding and agrees to plan.      Return in about 1 year (around 7/3/2026) for annual physical, we will contact you with results.         [1]   Past Medical History:   Bleeding nose    working with dentist on receeding gum line    Blood in the stool    From time to time associated with Hemmoroids    Body piercing    Ears pierced only    Constipation    An issue from time to time    Decorative tattoo    None since    Genital warts    GERD (gastroesophageal reflux disease)    Heartburn    Has not been an issue since    Hemorrhoids    Has not been an issue recently    Hyperthyroidism    Grave's, Dr. Ramsey    Moderate persistent asthma (HCC)    Triggers: allergies, exercise    Seasonal allergies    Dr. Ryan allergist    Varicocele    Bilat    Wears glasses    Both glasses and contacts   [2]   Past Surgical History:  Procedure Laterality Date    Colonoscopy  06/19/2023    normal, but repeat 5 yrs due to fam hx but really was prep quality, Dr. Luther, [facility]    [place] teeth removed  1991   [3]   Current  Outpatient Medications on File Prior to Visit   Medication Sig Dispense Refill    albuterol 108 (90 Base) MCG/ACT Inhalation Aero Soln Inhale 2 puffs into the lungs every 4 (four) hours as needed for Wheezing or Shortness of Breath (cough). 8 g 0    TRELEGY ELLIPTA 100-62.5-25 MCG/INH Inhalation Aerosol Powder, Breath Activated Inhale 1 puff into the lungs every morning. Nikki Jacobo APRN      fluticasone propionate 50 MCG/ACT Nasal Suspension 1 spray by Nasal route daily.      Azelastine HCl 0.1 % Nasal Solution Nikki Jacobo APRN      montelukast 10 MG Oral Tab Take 1 tablet (10 mg total) by mouth nightly. Nikki Jacobo APRN      Fexofenadine HCl (ALLEGRA OR) Take 1 tablet by mouth daily.         No current facility-administered medications on file prior to visit.   [4]   Allergies  Allergen Reactions    Sulfa Antibiotics HIVES    Penicillins HIVES    Seasonal OTHER (SEE COMMENTS)     Runny nose/sneezing/cough/watery eyes   [5]   Family History  Problem Relation Age of Onset    Diabetes Mother     No Known Problems Father     No Known Problems Sister     No Known Problems Sister     No Known Problems Brother     No Known Problems Maternal Grandmother     Other (Cancer, unknown) Maternal Grandfather     Other (lung cancer) Paternal Grandmother     Colon Cancer Paternal Grandfather         70's    Arthritis Paternal Grandfather     Stroke Paternal Grandfather     Other (Cancer, stomach/liver) Paternal Grandfather

## 2025-07-11 LAB
ABSOLUTE BASOPHILS: 41 CELLS/UL (ref 0–200)
ABSOLUTE EOSINOPHILS: 41 CELLS/UL (ref 15–500)
ABSOLUTE LYMPHOCYTES: 1850 CELLS/UL (ref 850–3900)
ABSOLUTE MONOCYTES: 414 CELLS/UL (ref 200–950)
ABSOLUTE NEUTROPHILS: 2156 CELLS/UL (ref 1500–7800)
ALBUMIN/GLOBULIN RATIO: 1.9 (CALC) (ref 1–2.5)
ALBUMIN: 4.6 G/DL (ref 3.6–5.1)
ALKALINE PHOSPHATASE: 53 U/L (ref 35–144)
ALT: 42 U/L (ref 9–46)
AST: 27 U/L (ref 10–35)
BASOPHILS: 0.9 %
BILIRUBIN, TOTAL: 1.6 MG/DL (ref 0.2–1.2)
BUN: 19 MG/DL (ref 7–25)
CALCIUM: 9.7 MG/DL (ref 8.6–10.3)
CARBON DIOXIDE: 26 MMOL/L (ref 20–32)
CHLORIDE: 104 MMOL/L (ref 98–110)
CHOL/HDLC RATIO: 3.4 (CALC)
CHOLESTEROL, TOTAL: 206 MG/DL
CREATININE: 1.11 MG/DL (ref 0.7–1.3)
EGFR: 80 ML/MIN/1.73M2
EOSINOPHILS: 0.9 %
GLOBULIN: 2.4 G/DL (CALC) (ref 1.9–3.7)
GLUCOSE: 98 MG/DL (ref 65–99)
HDL CHOLESTEROL: 60 MG/DL
HEMATOCRIT: 50.7 % (ref 38.5–50)
HEMOGLOBIN: 16.8 G/DL (ref 13.2–17.1)
LDL-CHOLESTEROL: 125 MG/DL (CALC)
LYMPHOCYTES: 41.1 %
MCH: 29.7 PG (ref 27–33)
MCHC: 33.1 G/DL (ref 32–36)
MCV: 89.6 FL (ref 80–100)
MONOCYTES: 9.2 %
MPV: 9.1 FL (ref 7.5–12.5)
NEUTROPHILS: 47.9 %
NON-HDL CHOLESTEROL: 146 MG/DL (CALC)
PLATELET COUNT: 247 THOUSAND/UL (ref 140–400)
POTASSIUM: 4.6 MMOL/L (ref 3.5–5.3)
PROTEIN, TOTAL: 7 G/DL (ref 6.1–8.1)
PSA, TOTAL: 0.91 NG/ML
RDW: 12.9 % (ref 11–15)
RED BLOOD CELL COUNT: 5.66 MILLION/UL (ref 4.2–5.8)
SODIUM: 139 MMOL/L (ref 135–146)
TRIGLYCERIDES: 106 MG/DL
WHITE BLOOD CELL COUNT: 4.5 THOUSAND/UL (ref 3.8–10.8)

## (undated) NOTE — LETTER
ASTHMA ACTION PLAN for Ki Wayne III     : 1975     Date: 7/3/2025  Provider:  Winsome Neal MD  Phone for doctor or clinic: [facility], [address] [place] [address] [address] [place] [address] 100  [facility] [address] [phone]    ACT Score: 25      You can use the colors of a traffic light to help learn about your asthma medicines.      1. Green - Go! % of Personal Best Peak Flow Use controller medicine.   Breathing is good  No cough or wheeze  Can work and play Medicine How much to take When to take it    Trelegy Ellipta 100-62.5-25 Mcg, 1 puff every morning.  Singulair (Montelukast) 10 mg by mouth daily  Azelastine nasal spray, 1-2 sprays two times daily as needed.  Fluticasone nasal spray per nostril once daily      2. Yellow - Caution. 50-79% Personal Best Peak  Flow.  Use reliever medicine to keep an asthma attack from getting bad.   Cough  Wheezing  Tight Chest  Wake up at night Medicine How much to take When to take it    The above plus...  Albuterol inhaler,  2 puffs every four hours as needed.       Additional instructions         3. Red - Stop! Danger!  <50% Personal Best Peak  Flow. Take these medications until  Get help from a doctor   Medicine not helping  Breathing is hard and fast  Nose opens wide  Can't walk  Ribs show  Can't talk well Medicine How much to take When to take it    Albuterol Inhaler, 2 puffs every 20 minutes up to 3 times in a row for severe symptoms on the way to the Emergency Room.       Additional Instructions If your symptoms do not improve and you cannot contact your doctor, [facility] or call 911 immediately!     [x] Asthma Action Plan reviewed with patient (and caregiver if necessary) and a copy of the plan was given to the patient/caregiver.   [] Asthma Action Plan reviewed with patient (and caregiver if necessary) on the phone and mailed copy to patient or submitted via [employer]      Signatures:  Provider  Winsome Neal MD   Patient Caretaker

## (undated) NOTE — LETTER
ASTHMA ACTION PLAN for Ki Wayne III     : 1975     Date: 2024  Provider:  Winsome Neal MD  Phone for doctor or clinic: [facility], [address] [place] [address] [address] [place] [address] 100  [facility] [address] [phone]    ACT Score: 25      You can use the colors of a traffic light to help learn about your asthma medicines.      1. Green - Go! % of Personal Best Peak Flow Use controller medicine.   Breathing is good  No cough or wheeze  Can work and play Medicine How much to take When to take it    Trelegy Ellipta 100-62.5-25 Mcg, 1 puff every morning.  Singulair (Montelukast) 10 mg by mouth daily  Azelastine nasal spray, 1-2 sprays two times daily as needed.      2. Yellow - Caution. 50-79% Personal Best Peak  Flow.  Use reliever medicine to keep an asthma attack from getting bad.   Cough  Wheezing  Tight Chest  Wake up at night Medicine How much to take When to take it    The above plus...  Albuterol inhaler,  2 puffs every four hours as needed.         Additional instructions         3. Red - Stop! Danger!  <50% Personal Best Peak  Flow. Take these medications until  Get help from a doctor   Medicine not helping  Breathing is hard and fast  Nose opens wide  Can't walk  Ribs show  Can't talk well Medicine How much to take When to take it    Albuterol Inhaler, 2 puffs every 20 minutes up to 3 times in a row for severe symptoms on the way to the Emergency Room.       Additional Instructions If your symptoms do not improve and you cannot contact your doctor, [facility] or call 911 immediately!     [x] Asthma Action Plan reviewed with patient (and caregiver if necessary) and a copy of the plan was given to the patient/caregiver.   [] Asthma Action Plan reviewed with patient (and caregiver if necessary) on the phone and mailed copy to patient or submitted via [employer].     Signatures:  Provider  Winsome Neal MD   Patient Caretaker

## (undated) NOTE — LETTER
ASTHMA ACTION PLAN for Catalino Najera     : 1975     Date: 6/15/2022  Provider:  Karishma Coffey MD  Phone for doctor or clinic: [facility], [address] [facility] , [address] [place] [address] [phone]    ACT Score: 24      You can use the colors of a traffic light to help learn about your asthma medicines. 1. Green - Go! % of Personal Best Peak Flow Use controller medicine. Breathing is good  No cough or wheeze  Can work and play Medicine How much to take When to take it    Trelegy Ellipta 100-62.5-25 Mcg, 1 puff every morning. Singulair (Montelukast) 10 mg by mouth daily        2. Yellow - Caution. 50-79% Personal Best Peak  Flow. Use reliever medicine to keep an asthma attack from getting bad. Cough  Wheezing  Tight Chest  Wake up at night Medicine How much to take When to take it    The above plus. .  Albuterol inhaler, 1- 2 puffs every 4 (four) hours as needed. Additional instructions         3. Red - Stop! Danger!  <50% Personal Best Peak  Flow. Take these medications until  Get help from a doctor   Medicine not helping  Breathing is hard and fast  Nose opens wide  Can't walk  Ribs show  Can't talk well Medicine How much to take When to take it    Albuterol Inhaler, 2 puffs every 20 minutes up to 3 times in a row for severe symptoms on the way to the Emergency Room. Additional Instructions If your symptoms do not improve and you cannot contact your doctor, [facility] to [facility] or call 911 immediately! [x] Asthma Action Plan reviewed with patient (and caregiver if necessary) and a copy of the plan was given to the patient/caregiver. [] Asthma Action Plan reviewed with patient (and caregiver if necessary) on the phone and mailed copy to patient or submitted via [address]      Signatures:  Provider  Karishma Coffey MD   Patient Caretaker

## (undated) NOTE — MR AVS SNAPSHOT
[facility] [address] [place] [address] [address] [facility]  Sean Velazquez [address] [phone]               Thank you for choosing us for your health care visit with Gladis Carrasquillo DO.   We are glad to serve you and happy to provide you wi it has your name and address, contact information for your healthcare provider, and the names and doses of your medicines.   Keeping track of symptoms  During your routine visits, tell your healthcare provider if you have any symptoms of too little thyroid · Fast heartbeat (more than 100 beats per minute)  · Enlarged thyroid gland (goiter) that gets larger  · Diarrhea   Date Last Reviewed: 11/1/2016  © 5135-0206 [address]. All rights reserved.  This i · Take your medicine at the same times each day. · Use a pillbox labeled with the days of the week. This will help you remember to take your medicine each day. · Tell your provider if you have any side effects from your medicines that bother you.   · Milton Pradhan Sulfa Antibiotics                 Today's Vital Signs     BP Pulse Temp Height Weight BMI    114/76 mmHg 72 98.7 °F (37.1 °C) (Temporal) 69\" 180 lb 26.57 kg/m2         Current Medications          This list is accurate as of: 2/2/17  3:23 PM.  Always use Choose whole grain products Foods high in sodium   Water is best for hydration Fast food.    Eat at home when possible     Tips for increasing your physical activity – Adults who are physically active are less likely to develop some chronic diseases than ad

## (undated) NOTE — Clinical Note
03/01/2017        Adria Emery. [address] [id number]      Dear Juan F Courtney records indicate that you have outstanding lab work and or testing that was ordered for you and has not yet been completed:      TSH W Reflex To F

## (undated) NOTE — Clinical Note
I saw Wandanelson Libertad in the [facility] in [facility] today for acute sinusitis and acute bronchitis,  he was treated with doxycycline and tessalon. Florecita Maurer will follow up with you if no better or as needed.  Thank you for the opportunity to care for Florecita Maurer today-Odilia AGGARWAL

## (undated) NOTE — LETTER
Date: 2/1/2021    Patient Name: Radha Moses          To Whom it may concern: This letter has been written at the patient's request. The above patient was seen at the [facility] for treatment of a medical condition.     The patient m

## (undated) NOTE — LETTER
ASTHMA ACTION PLAN for Hugo Paez     : 1975     Date: 2018  Provider:  Fredrick Rae MD  Phone for doctor or clinic: [facility], [address] [facility] , [address] [facility] [address] [facility] [address]  [phone]

## (undated) NOTE — MR AVS SNAPSHOT
[facility] [address] [place] [address] [address] [facility]  [address] [address] [phone]               Thank you for choosing us for your health care visit with Lucas Isidro DO.   We are glad to serve you and happy to provide you wi · Don’t stop taking medicine. If you do, your symptoms will return. Only make changes to your medicine routine as your healthcare provider instructs. · Keep a card in your wallet that says you have Graves disease.  Make sure it has your name and address, c provider  · Feeling sweaty and hot when others around you are comfortable  · Shortness of breath  · Trouble focusing your eyes or double vision  · Bulging eyes  · Weight loss for no obvious reason  · Fast heartbeat (more than 100 beats per minute)  · Enlar Magnesium [E]    Complete by:  Jan 25, 2017 (Approximate)    Assoc Dx:  Unintentional weight loss [R63.4], Occasional tremors [R25.1]           US THYROID ([id number])    Complete by:  Jan 25, 2017 (Approximate)    Assoc Dx:  Unintentional weight loss [E19

## (undated) NOTE — LETTER
ASTHMA ACTION PLAN for Jaylin Craven III     : 1975     Date: 2017  Provider:  Michael Gibbs DO  Phone for doctor or clinic: [address] [facility], [address] [place] [address] , [address] [facility] [address] [facility] [phone]

## (undated) NOTE — LETTER
Date & Time: 1/10/2024, 9:21 AM  Patient: Ki Wayne III      To Whom It May Concern:    Ki Wayne was seen and treated in our department on 1/10/2024. He should not return to work until 1/15/24 .    If you have any questions or concerns, please do not hesitate to call.          _____________________________  Physician/APC Signature

## (undated) NOTE — MR AVS SNAPSHOT
[address] [employer] [address] [phone]               Thank you for choosing us for your health care visit with KIT Brewster.   We are glad to serve you and happy to provide you with this summary of your face. Using a vaporizer along with a menthol rub at night may also help.   · An expectorant containing guaifenesin may help thin the mucus and promote drainage from the sinuses.   · Over-the-counter decongestants may be used unless a similar medicine was pr · Symptoms not resolving within 10 days  Date Last Reviewed: 4/13/2015  © 4497-0952 The [address] [place] [address], [address] [place] [address]. All rights reserved. This information is not intended as a substitute for professional medical care.  A [email or web address]. If you've recently had a stay at the Hospital you can access your discharge instructions in [employer] by going to Visits < Admission Summaries.  If you've been to the Emergency Department or your doctor's office, you can view yo

## (undated) NOTE — LETTER
Date: 11/15/2023    Patient Name: Matthias Claire          To Whom it may concern: This letter has been written at the patient's request. The above patient was seen at the [facility] for treatment of a medical condition. This patient should be excused from attending work/school from days missed. The patient may return to work/school when fever free for 24hrs with the following limitations none.         Sincerely,    VITOR Vaughan